# Patient Record
Sex: MALE | Race: WHITE | NOT HISPANIC OR LATINO | URBAN - METROPOLITAN AREA
[De-identification: names, ages, dates, MRNs, and addresses within clinical notes are randomized per-mention and may not be internally consistent; named-entity substitution may affect disease eponyms.]

---

## 2021-12-08 ENCOUNTER — SPOT (OUTPATIENT)
Dept: URBAN - METROPOLITAN AREA CLINIC 38 | Facility: CLINIC | Age: 76
Setting detail: DERMATOLOGY
End: 2021-12-08

## 2021-12-08 DIAGNOSIS — L57.0 ACTINIC KERATOSIS: ICD-10-CM

## 2021-12-08 PROBLEM — L82.1 OTHER SEBORRHEIC KERATOSIS: Status: RESOLVED | Noted: 2021-12-08

## 2021-12-08 PROBLEM — D48.5 NEOPLASM OF UNCERTAIN BEHAVIOR OF SKIN: Status: RESOLVED | Noted: 2021-12-08

## 2021-12-08 PROCEDURE — 99213 OFFICE O/P EST LOW 20 MIN: CPT

## 2021-12-14 ENCOUNTER — OTHER (OUTPATIENT)
Dept: URBAN - METROPOLITAN AREA CLINIC 38 | Facility: CLINIC | Age: 76
Setting detail: DERMATOLOGY
End: 2021-12-14

## 2021-12-14 DIAGNOSIS — D23.5 OTHER BENIGN NEOPLASM OF SKIN OF TRUNK: ICD-10-CM

## 2021-12-14 DIAGNOSIS — L90.5 SCAR CONDITIONS AND FIBROSIS OF SKIN: ICD-10-CM

## 2021-12-14 DIAGNOSIS — L57.8 OTHER SKIN CHANGES DUE TO CHRONIC EXPOSURE TO NONIONIZING RADIATION: ICD-10-CM

## 2021-12-14 DIAGNOSIS — Z85.828 PERSONAL HISTORY OF OTHER MALIGNANT NEOPLASM OF SKIN: ICD-10-CM

## 2021-12-14 DIAGNOSIS — L82.1 OTHER SEBORRHEIC KERATOSIS: ICD-10-CM

## 2021-12-14 PROBLEM — D48.5 NEOPLASM OF UNCERTAIN BEHAVIOR OF SKIN: Status: RESOLVED | Noted: 2021-12-14

## 2021-12-14 PROCEDURE — 17282 DSTR MAL LS F/E/E/N/L/M1.1-2: CPT

## 2022-10-27 ENCOUNTER — HOSPITAL ENCOUNTER (OUTPATIENT)
Dept: NON INVASIVE DIAGNOSTICS | Age: 77
Discharge: HOME | End: 2022-10-27
Payer: MEDICARE

## 2022-10-27 ENCOUNTER — OFFICE VISIT (OUTPATIENT)
Dept: CARDIOLOGY CLINIC | Age: 77
End: 2022-10-27
Payer: MEDICARE

## 2022-10-27 VITALS
WEIGHT: 217 LBS | DIASTOLIC BLOOD PRESSURE: 60 MMHG | SYSTOLIC BLOOD PRESSURE: 120 MMHG | OXYGEN SATURATION: 95 % | HEART RATE: 107 BPM

## 2022-10-27 DIAGNOSIS — I10 BENIGN ESSENTIAL HTN: ICD-10-CM

## 2022-10-27 DIAGNOSIS — I10 BENIGN ESSENTIAL HTN: Primary | ICD-10-CM

## 2022-10-27 DIAGNOSIS — R94.31 ABNORMAL EKG: ICD-10-CM

## 2022-10-27 PROBLEM — M19.019 PRIMARY OSTEOARTHRITIS OF SHOULDER: Status: ACTIVE | Noted: 2018-04-02

## 2022-10-27 PROBLEM — H35.3120 NONEXUDATIVE AGE-RELATED MACULAR DEGENERATION OF LEFT EYE: Status: ACTIVE | Noted: 2020-09-02

## 2022-10-27 PROBLEM — E78.2 MIXED HYPERLIPIDEMIA: Status: ACTIVE | Noted: 2018-04-02

## 2022-10-27 PROBLEM — F41.1 GAD (GENERALIZED ANXIETY DISORDER): Status: ACTIVE | Noted: 2018-04-02

## 2022-10-27 PROBLEM — M43.10 ACQUIRED SPONDYLOLISTHESIS: Status: ACTIVE | Noted: 2018-04-02

## 2022-10-27 PROBLEM — N40.0 HYPERPLASIA OF PROSTATE: Status: ACTIVE | Noted: 2018-04-02

## 2022-10-27 PROBLEM — M51.36 DDD (DEGENERATIVE DISC DISEASE), LUMBAR: Status: ACTIVE | Noted: 2022-07-18

## 2022-10-27 PROBLEM — M48.061 SPINAL STENOSIS OF LUMBAR REGION AT MULTIPLE LEVELS: Status: ACTIVE | Noted: 2022-07-18

## 2022-10-27 PROBLEM — R73.01 IFG (IMPAIRED FASTING GLUCOSE): Status: ACTIVE | Noted: 2020-01-13

## 2022-10-27 PROCEDURE — G8484 FLU IMMUNIZE NO ADMIN: HCPCS | Performed by: INTERNAL MEDICINE

## 2022-10-27 PROCEDURE — G8421 BMI NOT CALCULATED: HCPCS | Performed by: INTERNAL MEDICINE

## 2022-10-27 PROCEDURE — 1123F ACP DISCUSS/DSCN MKR DOCD: CPT | Performed by: INTERNAL MEDICINE

## 2022-10-27 PROCEDURE — 93226 XTRNL ECG REC<48 HR SCAN A/R: CPT

## 2022-10-27 PROCEDURE — 93017 CV STRESS TEST TRACING ONLY: CPT

## 2022-10-27 PROCEDURE — 99204 OFFICE O/P NEW MOD 45 MIN: CPT | Performed by: INTERNAL MEDICINE

## 2022-10-27 PROCEDURE — G8427 DOCREV CUR MEDS BY ELIG CLIN: HCPCS | Performed by: INTERNAL MEDICINE

## 2022-10-27 PROCEDURE — 93225 XTRNL ECG REC<48 HRS REC: CPT

## 2022-10-27 PROCEDURE — 3074F SYST BP LT 130 MM HG: CPT | Performed by: INTERNAL MEDICINE

## 2022-10-27 PROCEDURE — 1036F TOBACCO NON-USER: CPT | Performed by: INTERNAL MEDICINE

## 2022-10-27 PROCEDURE — 3078F DIAST BP <80 MM HG: CPT | Performed by: INTERNAL MEDICINE

## 2022-10-27 RX ORDER — SERTRALINE HYDROCHLORIDE 25 MG/1
25 TABLET, FILM COATED ORAL DAILY
COMMUNITY
Start: 2022-10-17

## 2022-10-27 RX ORDER — AMLODIPINE BESYLATE 10 MG/1
10 TABLET ORAL DAILY
COMMUNITY
Start: 2022-10-17

## 2022-10-27 RX ORDER — HYDROCHLOROTHIAZIDE 25 MG/1
25 TABLET ORAL DAILY
COMMUNITY
Start: 2022-10-17

## 2022-10-27 RX ORDER — DOXAZOSIN 8 MG/1
8 TABLET ORAL DAILY
COMMUNITY
Start: 2022-10-17

## 2022-10-27 RX ORDER — OMEPRAZOLE 10 MG/1
10 CAPSULE, DELAYED RELEASE ORAL DAILY
COMMUNITY

## 2022-10-27 NOTE — PROGRESS NOTES
Ov Dr. Binta Glass for new pt   Cardiac Clearance needed for back surgery   Pre op testing done fri at [facility]   Has abn ekg   No chest pain or heaviness   No palpitations   No sob   No dizziness or lightheadedness  No edema   had stress - [facility]- negative   Bedside echo done         Retired in  from [employer]   Just bought a house in [place] - lives in town   Mom had heart issues   1  sister   3 living - no heart issues   Non smoker   4 children -2 girls 2 boys   One girl - avis - is a teacher  One girl -frida is a     Will set up for treadmill stress test   And 24 hr holter

## 2022-10-29 LAB
ACQUISITION DURATION: NORMAL S
AVERAGE HEART RATE: 64 BPM
EKG DIAGNOSIS: NORMAL
HOLTER MAX HEART RATE: 102 BPM
HOOKUP DATE: NORMAL
HOOKUP TIME: NORMAL
MAX HEART RATE TIME/DATE: NORMAL
MIN HEART RATE TIME/DATE: NORMAL
MIN HEART RATE: 49 BPM
NUMBER OF QRS COMPLEXES: NORMAL
NUMBER OF SUPRAVENTRICULAR COUPLETS: 6
NUMBER OF SUPRAVENTRICULAR ECTOPICS: 1461
NUMBER OF SUPRAVENTRICULAR ISOLATED BEATS: 1268
NUMBER OF VENTRICULAR BIGEMINAL CYCLES: 7
NUMBER OF VENTRICULAR COUPLETS: 295
NUMBER OF VENTRICULAR ECTOPICS: 5015

## 2022-10-30 NOTE — PROCEDURES
[place] [address]                              CARDIAC STRESS TEST    PATIENT NAME: Nuria De Dios                  :        1945  MED REC NO:   [id number]                              ROOM:  ACCOUNT NO:   [de-identified]                           ADMIT DATE: 10/27/2022  PROVIDER:     Caitlin Schroeder. Charly Bill MD    DATE OF STUDY:  10/27/2022    TREADMILL STRESS TEST    He exercised 2 minutes on accelerated Ck protocol. His peak heart is  117 which is 92% of maximum predicted heart rate. He exercised 4 METs. His main limitation was that of severe arthritis. He had no chest pain. He had no ST depression. As previously stated above, is his stress test.  This is overall a  negative or normal cardiac stress test with no evidence of ischemia at  this workload.         Ladarius Staley MD    D: 10/29/2022 21:05:39       T: 10/30/2022 4:53:16     [id number]  Job#: [id number]     Doc#: [id number]    CC:

## 2022-10-30 NOTE — PROGRESS NOTES
Raheel Singh M.D. [address] [place] [address]  [phone]          2022          Jd Dietrich MD  [address] [place] [address], [address] [place] [address]      RE:   Luz Coleman  :  1945      Dear Dr. Delacruz Fitting:    Guillermo Chirinos:  Abnormal EKG with PVCs. HISTORY OF PRESENT ILLNESS:  I had the pleasure of seeing . Luz Coleman and his wife, Mallika Duke, in our office on 10/27/2022. He is a very pleasant 65-year-old gentleman who has never had a cardiac issue in the past.  He did have a stress test in , at [facility] which was negative. He stays active, although recently has had pain in L3-L4 and is pending having a laminectomy at [facility]. In preparation for his back surgery, he had an EKG that showed sinus rhythm with nonspecific ST changes and PVCs. Because of his trigeminy PVCs, I was asked to see him in consult. He denies any history of chest pain or chest discomfort. He normally is quite active. He has had no unusual shortness of breath and no palpitations. His energy level has been good. He has never had any syncope, near syncope, lightheadedness, or dizziness. He denies any pedal edema. He has never had a cardiac catheterization or myocardial infarction. CARDIAC RISK FACTORS:  Hyperlipidemia:  Negative. Hypertension:  Positive. Peripheral Vascular Disease:  Negative. Smoking:  Negative. Other Family Members:  Positive. Diabetes:  Negative. MEDICATIONS:  He is on Norvasc 10 mg daily, vitamin D 2000 units daily, Cardura 8 mg daily, HydroDIURIL 25 mg daily, Prilosec 10 mg daily, Zoloft 25 mg daily. PAST MEDICAL AND SURGICAL HISTORY:  1. He has had a long history of hypertension. 2.  He has degenerative disk disease. 3.  History of anxiety. 4.  Benign prostatic hypertrophy. 5.  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 6.  Spinal stenosis of the lumbar region.   7.  Left knee surgery. 8.  Skin cancer, basal cell carcinoma, removed. 9.  Vitrectomy in 2014 and 2015, on the left. FAMILY HISTORY:  Mother had coronary artery disease. One sister  of cancer. Four living, no heart disease. Mother had heart disease. SOCIAL HISTORY:  He is 65years old, , retired in , from the [employer] company. He and his wife downsized and bought a house in [place]. He does not smoke, does not drink alcohol. He has four children with two boys and two girls, one girl is a teacher, another girl lives in [place], is a . His wife, Dolly Jolly, works on outpatient surgery floor at [employer] in [place]. REVIEW OF SYSTEMS:  Cardiac as above. Other systems reviewed including constitutional, eyes, ears, nose and throat, cardiovascular, respiratory, GI, , musculoskeletal, integumentary, neurologic, psychiatric, endocrine, hematologic and allergic/immunologic are negative except for what is described above. No weight loss or weight gain. No change in bowel habits. No blood in stools. No fevers, sweats or chills. PHYSICAL EXAMINATION:  VITAL SIGNS:  His blood pressure was 120/60 with a heart rate of 107 and regular. Respirations were 18. O2 saturation 95%. Weight 217 pounds. GENERAL:  He is a very pleasant 24-year-old gentleman. Denied pain. He was oriented to person, place and time. Answered questions appropriately. SKIN:  No unusual skin changes. HEENT:  The pupils are equally round and intact. Mucous membranes were dry. NECK:  No JVD. Good carotid pulses. No carotid bruits. No lymphadenopathy or thyromegaly. CARDIOVASCULAR EXAM:  S1 and S2 were normal.  No S3 or S4. Soft systolic blowing type murmur. No diastolic murmur. PMI was normal.  No lift, thrust, or pericardial friction rub. LUNGS:  Clear to auscultation and percussion. ABDOMEN:  Soft and nontender. Good bowel sounds. EXTREMITIES:  Good femoral pulses. Good pedal pulses.   No pedal edema. Skin was warm and dry. No calf tenderness. Nail beds pink. Good cap refill. PULSES:  Bilateral symmetrical radial, brachial and carotid pulses. No carotid bruits. Good femoral and pedal pulses. NEUROLOGIC EXAM:  Within normal limits. PSYCHIATRIC EXAM:  Within normal limits. LABORATORY DATA:  His white count was 8.79, hemoglobin 15.5 with a platelet count [phone]. Sodium was 139, potassium 3.7, glucose 121, BUN 20, creatinine 1. 12.  Calcium was 9.2. AST was 18, ALT was 20. Hemoglobin A1c was 5.9. Cholesterol 160, triglycerides 140, HDL was 44, LDL 88. TSH was 0.72. EKG showed sinus rhythm, nonspecific ST changes with PVCs. I did a very low level treadmill stress test.  He exercised 2 minutes at 1.2 miles per hour with a 10% grade. He had no chest pain, no ST depression, although his heart was inadequate for diagnostic strut treadmill. His PVCs were at rest; they decreased during activity. Bedside echocardiogram showed normal LV function with ejection fraction of 55%. IMPRESSION:  1. PVCs with trigeminy. 2.  Decrease in his PVCs on a very low level treadmill. 3.  Normal LV function. 4.  No history of syncope or near syncope. 5.  Hypertension, well controlled. 6.  Cleared for L3-L4 disk disease. PLAN:  We will do a 24-hour Holter monitor to quantify his PVCs. DISCUSSION:  Mr. Juanito Jaimes is totally asymptomatic. He has had no chest pain, chest discomfort, shortness of breath, or loss of energy to indicate we need to do a full stress test.    He does have trigeminy on his PVCs. However, at this time, they appear to be benign. He has normal LV function with no symptoms of syncope or near syncope. During his very low level treadmill, they decreased in frequency. I will do a 24-hour Holter monitor to quantify his PVCs over 24 hours. From my standpoint, he is cleared for his laminectomy. We would not treat his PVCs at this time.   If by chance they happen to be more than 14,000 per 24 hours, then I would consider a low dose of Sectral which does a good job in decreasing PVCs. Again, he is cleared for laminectomy from my standpoint. Thank you very much for allowing me the privilege of seeing Anuel Martin. If you have any questions on my thoughts, please do not hesitate to contact me. Sincerely,        Nery Viera    D: 10/27/2022 12:34:49     T: 10/27/2022 12:39:52     [email or web address]  Job#: [id number]   Doc#: [id number]    ADDENDUM:    His holter monitor looked good. No tachycardia or bradycardia. Cleared for surgery.

## 2022-11-01 ENCOUNTER — TELEPHONE (OUTPATIENT)
Dept: CARDIOLOGY CLINIC | Age: 77
End: 2022-11-01

## 2023-09-19 PROBLEM — R39.12 WEAK URINARY STREAM: Status: ACTIVE | Noted: 2023-09-19

## 2023-09-19 PROBLEM — N40.1 BENIGN PROSTATIC HYPERPLASIA WITH URINARY OBSTRUCTION AND OTHER LOWER URINARY TRACT SYMPTOMS: Status: ACTIVE | Noted: 2023-09-19

## 2023-09-19 PROBLEM — N52.9 ERECTILE DYSFUNCTION: Status: ACTIVE | Noted: 2023-09-19

## 2023-09-19 PROBLEM — R35.1 NOCTURIA: Status: ACTIVE | Noted: 2023-09-19

## 2023-09-19 PROBLEM — N28.1 RENAL CYST: Status: ACTIVE | Noted: 2023-09-19

## 2023-09-19 PROBLEM — N13.8 BENIGN PROSTATIC HYPERPLASIA WITH URINARY OBSTRUCTION AND OTHER LOWER URINARY TRACT SYMPTOMS: Status: ACTIVE | Noted: 2023-09-19

## 2023-09-19 PROBLEM — R97.20 ELEVATED PSA: Status: ACTIVE | Noted: 2023-09-19

## 2023-09-19 RX ORDER — HYDROCHLOROTHIAZIDE 25 MG/1
TABLET ORAL
COMMUNITY

## 2023-09-19 RX ORDER — AMLODIPINE BESYLATE 5 MG/1
2.5 TABLET ORAL DAILY
COMMUNITY

## 2023-09-19 RX ORDER — FLUTICASONE PROPIONATE 50 MCG
SPRAY, SUSPENSION (ML) NASAL
COMMUNITY
End: 2023-10-19 | Stop reason: ALTCHOICE

## 2023-10-12 ENCOUNTER — LAB (OUTPATIENT)
Dept: LAB | Facility: LAB | Age: 78
End: 2023-10-12
Payer: MEDICARE

## 2023-10-12 DIAGNOSIS — R35.1 NOCTURIA: ICD-10-CM

## 2023-10-12 DIAGNOSIS — N40.1 BENIGN PROSTATIC HYPERPLASIA WITH LOWER URINARY TRACT SYMPTOMS: Primary | ICD-10-CM

## 2023-10-12 DIAGNOSIS — N40.1 BENIGN PROSTATIC HYPERPLASIA WITH LOWER URINARY TRACT SYMPTOMS: ICD-10-CM

## 2023-10-12 DIAGNOSIS — R97.20 ELEVATED PROSTATE SPECIFIC ANTIGEN (PSA): ICD-10-CM

## 2023-10-12 LAB — PSA SERPL-MCNC: 8.09 NG/ML

## 2023-10-12 PROCEDURE — 84153 ASSAY OF PSA TOTAL: CPT

## 2023-10-12 PROCEDURE — 36415 COLL VENOUS BLD VENIPUNCTURE: CPT

## 2023-10-16 ENCOUNTER — APPOINTMENT (OUTPATIENT)
Dept: URBAN - METROPOLITAN AREA CLINIC 204 | Age: 78
Setting detail: DERMATOLOGY
End: 2023-10-17

## 2023-10-16 PROCEDURE — OTHER MIPS QUALITY: OTHER

## 2023-10-16 PROCEDURE — 17000 DESTRUCT PREMALG LESION: CPT

## 2023-10-16 PROCEDURE — 99213 OFFICE O/P EST LOW 20 MIN: CPT | Mod: 25

## 2023-10-16 PROCEDURE — 17003 DESTRUCT PREMALG LES 2-14: CPT

## 2023-10-18 ASSESSMENT — ENCOUNTER SYMPTOMS
NAUSEA: 0
SHORTNESS OF BREATH: 0
COUGH: 0
CHILLS: 0
EYES NEGATIVE: 1
PSYCHIATRIC NEGATIVE: 1
FEVER: 0
ALLERGIC/IMMUNOLOGIC NEGATIVE: 1
ENDOCRINE NEGATIVE: 1
DIFFICULTY URINATING: 0

## 2023-10-18 NOTE — PROGRESS NOTES
Subjective   Patient ID: Evan Sewell is a 78 y.o. male.    HPI  Patient has Hx of Elevated PSA. Most recent PSA was 8.09 on 10/23. Prior PSA was  5.77 (9/22) PRevious PSA was 6.53 on 12/2021. Prior PSA was 6.75 11/20...patient did have a Urine select test 11/18 which showed 38% chance of detecting low grade and a 13% chance of detecting a [place] of 7 or higher..he has had a couple negative bx done in the past..12/13 was most recent bx done...Chronic BPH Sx are mild and stable...No urgency or frequency...weak stream at times...No dysuria..No hematuria...Nocturia 2-3. ...caffeine does worsen sx...He is taking Doxazosin for HTN. . Patient has Hx of Microhematuria and Renal Cyst. Last U/S and cysto was on 6/23. . ED is mild and not an issue .       Review of Systems   Constitutional:  Negative for chills and fever.   HENT: Negative.     Eyes: Negative.    Respiratory:  Negative for cough and shortness of breath.    Cardiovascular:  Negative for chest pain and leg swelling.   Gastrointestinal:  Negative for nausea.   Endocrine: Negative.    Genitourinary:  Negative for difficulty urinating.        Negative except for documented in HPI   Allergic/Immunologic: Negative.    Neurological:         Alert & oriented X 3   Hematological:         Denies blood thinners   Psychiatric/Behavioral: Negative.         Objective   Physical Exam  Vitals and nursing note reviewed.   Constitutional:       General: He is not in acute distress.     Appearance: Normal appearance.   Pulmonary:      Effort: Pulmonary effort is normal.   Abdominal:      Tenderness: There is no abdominal tenderness.   Genitourinary:     Comments: Kidneys non palpable bilaterally  Bladder non palpable or tender  Scrotum no mass, No hydrocele  Epididymis- No spermatocele. Non Tender.  Testicles: No mass  Urethra: No discharge  Penis within normal limits... No lesions  Prostate - Slightly Asymmetric, no nodules. Slightly firmer on L  Seminal Vesicals: No  mass.  Sphincter tone: normal  Neurological:      Mental Status: He is alert.       Assessment/Plan       Diagnoses and all orders for this visit:  Benign prostatic hyperplasia with urinary obstruction and other lower urinary tract symptoms  Elevated PSA  Erectile dysfunction, unspecified erectile dysfunction type  Nocturia      All available PSA values reviewed, Options discussed. Questions answered.  Pros and cons of prostate biopsy reviewed. Other options discussed. Questions answered  Past Bx reviewed  MRI ordered   Diet changes for prostate health discussed and educational information given. Pros/Cons of prostate health supplements discussed.   Treatment options for LUTS reviewed  Cardura neeta  Discussed timed voiding. Discussed fluid and caffeine intake  Treatment options for ED reviewed.  Lifestyle change to help prevent UTIs discussed. Encouraged fluid intake.    F/U with MRI

## 2023-10-19 ENCOUNTER — OFFICE VISIT (OUTPATIENT)
Dept: UROLOGY | Facility: CLINIC | Age: 78
End: 2023-10-19
Payer: MEDICARE

## 2023-10-19 VITALS — RESPIRATION RATE: 16 BRPM | HEIGHT: 74 IN | BODY MASS INDEX: 28.75 KG/M2 | WEIGHT: 224 LBS

## 2023-10-19 DIAGNOSIS — N52.9 ERECTILE DYSFUNCTION, UNSPECIFIED ERECTILE DYSFUNCTION TYPE: ICD-10-CM

## 2023-10-19 DIAGNOSIS — N13.8 BENIGN PROSTATIC HYPERPLASIA WITH URINARY OBSTRUCTION AND OTHER LOWER URINARY TRACT SYMPTOMS: ICD-10-CM

## 2023-10-19 DIAGNOSIS — R35.1 NOCTURIA: ICD-10-CM

## 2023-10-19 DIAGNOSIS — R97.20 ELEVATED PSA: ICD-10-CM

## 2023-10-19 DIAGNOSIS — N40.1 BENIGN PROSTATIC HYPERPLASIA WITH URINARY OBSTRUCTION AND OTHER LOWER URINARY TRACT SYMPTOMS: ICD-10-CM

## 2023-10-19 PROCEDURE — 1036F TOBACCO NON-USER: CPT | Performed by: UROLOGY

## 2023-10-19 PROCEDURE — 99214 OFFICE O/P EST MOD 30 MIN: CPT | Performed by: UROLOGY

## 2023-10-19 RX ORDER — OMEPRAZOLE 10 MG/1
10 CAPSULE, DELAYED RELEASE ORAL
COMMUNITY

## 2023-10-19 RX ORDER — SERTRALINE HYDROCHLORIDE 25 MG/1
25 TABLET, FILM COATED ORAL
COMMUNITY
Start: 2022-10-17

## 2023-10-19 RX ORDER — DOXAZOSIN 8 MG/1
8 TABLET ORAL DAILY
COMMUNITY

## 2023-10-19 RX ORDER — GABAPENTIN 300 MG/1
300 CAPSULE ORAL 3 TIMES DAILY
COMMUNITY
Start: 2023-10-06 | End: 2024-04-18 | Stop reason: WASHOUT

## 2023-10-26 ENCOUNTER — HOSPITAL ENCOUNTER (OUTPATIENT)
Dept: RADIOLOGY | Facility: HOSPITAL | Age: 78
Discharge: HOME | End: 2023-10-26
Payer: MEDICARE

## 2023-10-26 DIAGNOSIS — R97.20 ELEVATED PSA: ICD-10-CM

## 2023-10-26 PROCEDURE — 72197 MRI PELVIS W/O & W/DYE: CPT | Mod: MG

## 2023-10-26 PROCEDURE — 2550000001 HC RX 255 CONTRASTS: Performed by: UROLOGY

## 2023-10-26 PROCEDURE — 72197 MRI PELVIS W/O & W/DYE: CPT | Performed by: RADIOLOGY

## 2023-10-26 PROCEDURE — A9575 INJ GADOTERATE MEGLUMI 0.1ML: HCPCS | Performed by: UROLOGY

## 2023-10-26 RX ORDER — GADOTERATE MEGLUMINE 376.9 MG/ML
20 INJECTION INTRAVENOUS
Status: COMPLETED | OUTPATIENT
Start: 2023-10-26 | End: 2023-10-26

## 2023-10-26 RX ADMIN — GADOTERATE MEGLUMINE 20 ML: 376.9 INJECTION INTRAVENOUS at 14:29

## 2023-10-31 ENCOUNTER — APPOINTMENT (OUTPATIENT)
Dept: URBAN - METROPOLITAN AREA CLINIC 204 | Age: 78
Setting detail: DERMATOLOGY
End: 2023-11-01

## 2023-10-31 PROCEDURE — OTHER MIPS QUALITY: OTHER

## 2023-10-31 PROCEDURE — 17272 DSTR MAL LES S/N/H/F/G 1.1-2: CPT

## 2023-11-01 ASSESSMENT — ENCOUNTER SYMPTOMS
COUGH: 0
SHORTNESS OF BREATH: 0
EYES NEGATIVE: 1
CHILLS: 0
FEVER: 0
DIFFICULTY URINATING: 0
PSYCHIATRIC NEGATIVE: 1
NAUSEA: 0
ALLERGIC/IMMUNOLOGIC NEGATIVE: 1
ENDOCRINE NEGATIVE: 1

## 2023-11-01 NOTE — PROGRESS NOTES
Subjective   Patient ID: Evan Sewell is a 78 y.o. male.    HPI  Patient is here for prostate MRI results. MRI showed 3.1cm PI-RAD 5 lesion, multiple enhancing osseous lesion seen within the left ronan sacrum.  Most recent PSA was 8.09 on 10/23. Prior PSA was  5.77 (9/22) PRevious PSA was 6.53 on 12/2021. Prior PSA was 6.75 11/20...patient did have a Urine select test 11/18 which showed 38% chance of detecting low grade and a 13% chance of detecting a [place] of 7 or higher..he has had a couple negative bx done in the past..12/13 was most recent bx done...Chronic BPH Sx are mild and stable...No urgency or frequency...weak stream at times...No dysuria..No hematuria...Nocturia 2-3. ...caffeine does worsen sx...He is taking Doxazosin for HTN. . Patient has Hx of Microhematuria and Renal Cyst. Last U/S and cysto was on 6/23. . ED is mild and not an issue .       Review of Systems   Constitutional:  Negative for chills and fever.   HENT: Negative.     Eyes: Negative.    Respiratory:  Negative for cough and shortness of breath.    Cardiovascular:  Negative for chest pain and leg swelling.   Gastrointestinal:  Negative for nausea.   Endocrine: Negative.    Genitourinary:  Negative for difficulty urinating.        Negative except for documented in HPI   Allergic/Immunologic: Negative.    Neurological:         Alert & oriented X 3   Hematological:         Denies blood thinners   Psychiatric/Behavioral: Negative.         Objective   Physical Exam  Vitals and nursing note reviewed.   Constitutional:       General: He is not in acute distress.     Appearance: Normal appearance.   Pulmonary:      Effort: Pulmonary effort is normal.   Abdominal:      Tenderness: There is no abdominal tenderness.   Genitourinary:     Comments: Kidneys non palpable bilaterally  Bladder non palpable or tender  Scrotum no mass, No hydrocele  Epididymis- No spermatocele. Non Tender.  Testicles: No mass  Urethra: No discharge  Penis within normal  limits... No lesions  Prostate - deferred  Neurological:      Mental Status: He is alert.         Assessment/Plan   Diagnoses and all orders for this visit:  Benign prostatic hyperplasia with urinary obstruction and other lower urinary tract symptoms  Elevated PSA  Nocturia  Erectile dysfunction, unspecified erectile dysfunction type      All available PSA values reviewed, Options discussed. Questions answered.  Pros and cons of prostate biopsy reviewed. Other options discussed. Questions answered  MRI reviewed and Bx scheduled  Bone scan svheduled  Cipro Rx given   Diet changes for prostate health discussed and educational information given. Pros/Cons of prostate health supplements discussed.   Treatment options for LUTS reviewed  Discussed timed voiding. Discussed fluid and caffeine intake  Treatment options for ED reviewed.  Lifestyle change to help prevent UTIs discussed. Encouraged fluid intake.    F/U with Bone Scan and Prostate Bx     Yes

## 2023-11-01 NOTE — H&P (VIEW-ONLY)
Subjective   Patient ID: Evan Sewell is a 78 y.o. male.    HPI  Patient is here for prostate MRI results. MRI showed 3.1cm PI-RAD 5 lesion, multiple enhancing osseous lesion seen within the left ronan sacrum.  Most recent PSA was 8.09 on 10/23. Prior PSA was  5.77 (9/22) PRevious PSA was 6.53 on 12/2021. Prior PSA was 6.75 11/20...patient did have a Urine select test 11/18 which showed 38% chance of detecting low grade and a 13% chance of detecting a [place] of 7 or higher..he has had a couple negative bx done in the past..12/13 was most recent bx done...Chronic BPH Sx are mild and stable...No urgency or frequency...weak stream at times...No dysuria..No hematuria...Nocturia 2-3. ...caffeine does worsen sx...He is taking Doxazosin for HTN. . Patient has Hx of Microhematuria and Renal Cyst. Last U/S and cysto was on 6/23. . ED is mild and not an issue .       Review of Systems   Constitutional:  Negative for chills and fever.   HENT: Negative.     Eyes: Negative.    Respiratory:  Negative for cough and shortness of breath.    Cardiovascular:  Negative for chest pain and leg swelling.   Gastrointestinal:  Negative for nausea.   Endocrine: Negative.    Genitourinary:  Negative for difficulty urinating.        Negative except for documented in HPI   Allergic/Immunologic: Negative.    Neurological:         Alert & oriented X 3   Hematological:         Denies blood thinners   Psychiatric/Behavioral: Negative.         Objective   Physical Exam  Vitals and nursing note reviewed.   Constitutional:       General: He is not in acute distress.     Appearance: Normal appearance.   Pulmonary:      Effort: Pulmonary effort is normal.   Abdominal:      Tenderness: There is no abdominal tenderness.   Genitourinary:     Comments: Kidneys non palpable bilaterally  Bladder non palpable or tender  Scrotum no mass, No hydrocele  Epididymis- No spermatocele. Non Tender.  Testicles: No mass  Urethra: No discharge  Penis within normal  limits... No lesions  Prostate - deferred  Neurological:      Mental Status: He is alert.         Assessment/Plan   Diagnoses and all orders for this visit:  Benign prostatic hyperplasia with urinary obstruction and other lower urinary tract symptoms  Elevated PSA  Nocturia  Erectile dysfunction, unspecified erectile dysfunction type      All available PSA values reviewed, Options discussed. Questions answered.  Pros and cons of prostate biopsy reviewed. Other options discussed. Questions answered  MRI reviewed and Bx scheduled  Bone scan svheduled  Cipro Rx given   Diet changes for prostate health discussed and educational information given. Pros/Cons of prostate health supplements discussed.   Treatment options for LUTS reviewed  Discussed timed voiding. Discussed fluid and caffeine intake  Treatment options for ED reviewed.  Lifestyle change to help prevent UTIs discussed. Encouraged fluid intake.    F/U with Bone Scan and Prostate Bx

## 2023-11-02 ENCOUNTER — OFFICE VISIT (OUTPATIENT)
Dept: UROLOGY | Facility: CLINIC | Age: 78
End: 2023-11-02
Payer: MEDICARE

## 2023-11-02 VITALS — BODY MASS INDEX: 29.13 KG/M2 | HEIGHT: 74 IN | WEIGHT: 227 LBS | RESPIRATION RATE: 16 BRPM

## 2023-11-02 DIAGNOSIS — N52.9 ERECTILE DYSFUNCTION, UNSPECIFIED ERECTILE DYSFUNCTION TYPE: ICD-10-CM

## 2023-11-02 DIAGNOSIS — R35.1 NOCTURIA: ICD-10-CM

## 2023-11-02 DIAGNOSIS — N40.1 BENIGN PROSTATIC HYPERPLASIA WITH URINARY OBSTRUCTION AND OTHER LOWER URINARY TRACT SYMPTOMS: ICD-10-CM

## 2023-11-02 DIAGNOSIS — N13.8 BENIGN PROSTATIC HYPERPLASIA WITH URINARY OBSTRUCTION AND OTHER LOWER URINARY TRACT SYMPTOMS: ICD-10-CM

## 2023-11-02 DIAGNOSIS — R97.20 ELEVATED PSA: ICD-10-CM

## 2023-11-02 PROCEDURE — 99213 OFFICE O/P EST LOW 20 MIN: CPT | Performed by: UROLOGY

## 2023-11-02 PROCEDURE — 1159F MED LIST DOCD IN RCRD: CPT | Performed by: UROLOGY

## 2023-11-02 PROCEDURE — 1036F TOBACCO NON-USER: CPT | Performed by: UROLOGY

## 2023-11-02 RX ORDER — CIPROFLOXACIN 500 MG/1
500 TABLET ORAL 2 TIMES DAILY
Qty: 6 TABLET | Refills: 0 | Status: SHIPPED | OUTPATIENT
Start: 2023-11-02 | End: 2023-11-05

## 2023-11-03 ENCOUNTER — PREP FOR PROCEDURE (OUTPATIENT)
Dept: UROLOGY | Facility: CLINIC | Age: 78
End: 2023-11-03
Payer: MEDICARE

## 2023-11-03 DIAGNOSIS — R97.20 ELEVATED PSA: ICD-10-CM

## 2023-11-06 ENCOUNTER — APPOINTMENT (OUTPATIENT)
Dept: UROLOGY | Facility: CLINIC | Age: 78
End: 2023-11-06
Payer: MEDICARE

## 2023-11-08 ENCOUNTER — HOSPITAL ENCOUNTER (OUTPATIENT)
Dept: RADIOLOGY | Facility: HOSPITAL | Age: 78
Discharge: HOME | End: 2023-11-08
Payer: MEDICARE

## 2023-11-08 DIAGNOSIS — R97.20 ELEVATED PSA: ICD-10-CM

## 2023-11-08 PROCEDURE — 78306 BONE IMAGING WHOLE BODY: CPT | Performed by: RADIOLOGY

## 2023-11-08 PROCEDURE — 3430000001 HC RX 343 DIAGNOSTIC RADIOPHARMACEUTICALS: Performed by: UROLOGY

## 2023-11-08 PROCEDURE — A9503 TC99M MEDRONATE: HCPCS | Performed by: UROLOGY

## 2023-11-08 PROCEDURE — 78306 BONE IMAGING WHOLE BODY: CPT

## 2023-11-08 RX ADMIN — TECHNETIUM TC 99M MEDRONATE 27.1 MILLICURIE: 25 INJECTION, POWDER, FOR SOLUTION INTRAVENOUS at 09:20

## 2023-11-10 RX ORDER — CALCIUM CARB/VITAMIN D3/VIT K1 500-500-40
1 TABLET,CHEWABLE ORAL DAILY
COMMUNITY

## 2023-11-14 ENCOUNTER — ANESTHESIA (OUTPATIENT)
Dept: OPERATING ROOM | Facility: HOSPITAL | Age: 78
End: 2023-11-14
Payer: MEDICARE

## 2023-11-14 ENCOUNTER — ANESTHESIA EVENT (OUTPATIENT)
Dept: OPERATING ROOM | Facility: HOSPITAL | Age: 78
End: 2023-11-14
Payer: MEDICARE

## 2023-11-14 ENCOUNTER — HOSPITAL ENCOUNTER (OUTPATIENT)
Facility: HOSPITAL | Age: 78
Setting detail: OUTPATIENT SURGERY
Discharge: HOME | End: 2023-11-14
Attending: UROLOGY | Admitting: UROLOGY
Payer: MEDICARE

## 2023-11-14 VITALS
TEMPERATURE: 97 F | RESPIRATION RATE: 16 BRPM | WEIGHT: 223.77 LBS | SYSTOLIC BLOOD PRESSURE: 134 MMHG | HEIGHT: 74 IN | BODY MASS INDEX: 28.72 KG/M2 | HEART RATE: 56 BPM | OXYGEN SATURATION: 95 % | DIASTOLIC BLOOD PRESSURE: 84 MMHG

## 2023-11-14 DIAGNOSIS — R97.20 ELEVATED PSA: Primary | ICD-10-CM

## 2023-11-14 PROCEDURE — G0416 PROSTATE BIOPSY, ANY MTHD: HCPCS | Performed by: STUDENT IN AN ORGANIZED HEALTH CARE EDUCATION/TRAINING PROGRAM

## 2023-11-14 PROCEDURE — 55700 PR PROSTATE NEEDLE BIOPSY ANY APPROACH: CPT | Performed by: UROLOGY

## 2023-11-14 PROCEDURE — G0416 PROSTATE BIOPSY, ANY MTHD: HCPCS | Mod: TC,SUR,SAMLAB | Performed by: UROLOGY

## 2023-11-14 PROCEDURE — 2500000005 HC RX 250 GENERAL PHARMACY W/O HCPCS: Performed by: ANESTHESIOLOGY

## 2023-11-14 PROCEDURE — 3600000009 HC OR TIME - EACH INCREMENTAL 1 MINUTE - PROCEDURE LEVEL FOUR: Performed by: UROLOGY

## 2023-11-14 PROCEDURE — 7100000010 HC PHASE TWO TIME - EACH INCREMENTAL 1 MINUTE: Performed by: UROLOGY

## 2023-11-14 PROCEDURE — 2500000004 HC RX 250 GENERAL PHARMACY W/ HCPCS (ALT 636 FOR OP/ED): Performed by: ANESTHESIOLOGY

## 2023-11-14 PROCEDURE — 76942 ECHO GUIDE FOR BIOPSY: CPT | Performed by: UROLOGY

## 2023-11-14 PROCEDURE — 2720000007 HC OR 272 NO HCPCS: Performed by: UROLOGY

## 2023-11-14 PROCEDURE — 7100000009 HC PHASE TWO TIME - INITIAL BASE CHARGE: Performed by: UROLOGY

## 2023-11-14 PROCEDURE — 3600000004 HC OR TIME - INITIAL BASE CHARGE - PROCEDURE LEVEL FOUR: Performed by: UROLOGY

## 2023-11-14 PROCEDURE — 3700000001 HC GENERAL ANESTHESIA TIME - INITIAL BASE CHARGE: Performed by: UROLOGY

## 2023-11-14 PROCEDURE — 3700000002 HC GENERAL ANESTHESIA TIME - EACH INCREMENTAL 1 MINUTE: Performed by: UROLOGY

## 2023-11-14 RX ORDER — SODIUM CHLORIDE, SODIUM LACTATE, POTASSIUM CHLORIDE, CALCIUM CHLORIDE 600; 310; 30; 20 MG/100ML; MG/100ML; MG/100ML; MG/100ML
100 INJECTION, SOLUTION INTRAVENOUS CONTINUOUS
Status: DISCONTINUED | OUTPATIENT
Start: 2023-11-14 | End: 2023-11-14 | Stop reason: HOSPADM

## 2023-11-14 RX ORDER — ONDANSETRON HYDROCHLORIDE 2 MG/ML
INJECTION, SOLUTION INTRAVENOUS AS NEEDED
Status: DISCONTINUED | OUTPATIENT
Start: 2023-11-14 | End: 2023-11-14

## 2023-11-14 RX ORDER — MIDAZOLAM HYDROCHLORIDE 1 MG/ML
2 INJECTION INTRAMUSCULAR; INTRAVENOUS ONCE AS NEEDED
Status: DISCONTINUED | OUTPATIENT
Start: 2023-11-14 | End: 2023-11-14 | Stop reason: HOSPADM

## 2023-11-14 RX ORDER — PROPOFOL 10 MG/ML
INJECTION, EMULSION INTRAVENOUS AS NEEDED
Status: DISCONTINUED | OUTPATIENT
Start: 2023-11-14 | End: 2023-11-14

## 2023-11-14 RX ORDER — MIDAZOLAM HYDROCHLORIDE 1 MG/ML
INJECTION, SOLUTION INTRAMUSCULAR; INTRAVENOUS AS NEEDED
Status: DISCONTINUED | OUTPATIENT
Start: 2023-11-14 | End: 2023-11-14

## 2023-11-14 RX ORDER — HYDROCODONE BITARTRATE AND ACETAMINOPHEN 5; 325 MG/1; MG/1
1 TABLET ORAL EVERY 6 HOURS PRN
Qty: 10 TABLET | Refills: 0 | Status: SHIPPED | OUTPATIENT
Start: 2023-11-14 | End: 2024-04-18 | Stop reason: WASHOUT

## 2023-11-14 RX ORDER — LIDOCAINE HYDROCHLORIDE 10 MG/ML
INJECTION, SOLUTION EPIDURAL; INFILTRATION; INTRACAUDAL; PERINEURAL AS NEEDED
Status: DISCONTINUED | OUTPATIENT
Start: 2023-11-14 | End: 2023-11-14

## 2023-11-14 RX ADMIN — LIDOCAINE HYDROCHLORIDE 25 MG: 10 INJECTION, SOLUTION EPIDURAL; INFILTRATION; INTRACAUDAL; PERINEURAL at 08:03

## 2023-11-14 RX ADMIN — MIDAZOLAM 1 MG: 1 INJECTION INTRAMUSCULAR; INTRAVENOUS at 08:03

## 2023-11-14 RX ADMIN — ONDANSETRON 4 MG: 2 INJECTION INTRAMUSCULAR; INTRAVENOUS at 08:04

## 2023-11-14 RX ADMIN — SODIUM CHLORIDE, POTASSIUM CHLORIDE, SODIUM LACTATE AND CALCIUM CHLORIDE 100 ML/HR: 600; 310; 30; 20 INJECTION, SOLUTION INTRAVENOUS at 07:56

## 2023-11-14 RX ADMIN — MIDAZOLAM 1 MG: 1 INJECTION INTRAMUSCULAR; INTRAVENOUS at 08:01

## 2023-11-14 RX ADMIN — PROPOFOL 50 MG: 10 INJECTION, EMULSION INTRAVENOUS at 08:03

## 2023-11-14 SDOH — HEALTH STABILITY: MENTAL HEALTH: CURRENT SMOKER: 0

## 2023-11-14 ASSESSMENT — PAIN SCALES - GENERAL
PAINLEVEL_OUTOF10: 0 - NO PAIN
PAIN_LEVEL: 3
PAINLEVEL_OUTOF10: 0 - NO PAIN

## 2023-11-14 ASSESSMENT — PAIN - FUNCTIONAL ASSESSMENT
PAIN_FUNCTIONAL_ASSESSMENT: 0-10

## 2023-11-14 NOTE — ANESTHESIA POSTPROCEDURE EVALUATION
Patient: Evan Sewell    Procedure Summary       Date: 11/14/23 Room / Location: [facility] OR 01 / Virtual [facility] OR    Anesthesia Start: 0758 Anesthesia Stop: 0814    Procedure: PROSTATE NEEDLE BX Diagnosis:       Elevated PSA      (Elevated PSA [R97.20])    Surgeons: Gustabo Smith MD Responsible Provider: Gerald Horvath MD    Anesthesia Type: MAC ASA Status: 2            Anesthesia Type: MAC    Vitals Value Taken Time   /93 11/14/23 0817   Temp 36.1 °C (97 °F) 11/14/23 0817   Pulse 65 11/14/23 0817   Resp 18 11/14/23 0817   SpO2 97 % 11/14/23 0817       Anesthesia Post Evaluation    Patient location during evaluation: PACU  Patient participation: complete - patient participated  Level of consciousness: awake and alert  Pain score: 3  Pain management: adequate  Multimodal analgesia pain management approach  Airway patency: patent  Cardiovascular status: acceptable  Respiratory status: acceptable  Hydration status: acceptable          No notable events documented.

## 2023-11-14 NOTE — ANESTHESIA PREPROCEDURE EVALUATION
Patient: Evan Sewell    Procedure Information       Date/Time: 11/14/23 0745    Procedures:       PROSTATE NEEDLE BX      X      X    Location: [facility] OR 01 / [facility] OR    Surgeons: Gustabo Smith MD            Relevant Problems   No relevant active problems       Clinical information reviewed:   Tobacco  Allergies  Meds   Med Hx  Surg Hx   Fam Hx  Soc Hx        NPO Detail:  NPO/Void Status  Carbonhydrate Drink Given Prior to Surgery? : N  Date of Last Liquid: 11/14/23  Time of Last Liquid: 0500  Date of Last Solid: 11/13/23  Time of Last Solid: 1800  Last Intake Type: Clear fluids  Time of Last Void: 0629         Physical Exam    Airway  Mallampati: II  TM distance: >3 FB  Neck ROM: full     Cardiovascular   Rhythm: regular  Rate: normal     Dental    Pulmonary   Breath sounds clear to auscultation     Abdominal            Anesthesia Plan    ASA 2     MAC     The patient is not a current smoker.  Patient was not previously instructed to abstain from smoking on day of procedure.  Patient did not smoke on day of procedure.    intravenous induction   Postoperative administration of opioids is intended.  Anesthetic plan and risks discussed with patient.

## 2023-11-14 NOTE — OP NOTE
PROSTATE NEEDLE BX, X, X Operative Note     Date: 2023  OR Location: [facility] OR    Name: Evan Sewell, : 1945, Age: 78 y.o., MRN: [id number], Sex: male    Diagnosis  Pre-op Diagnosis     * Elevated PSA [R97.20] Post-op Diagnosis     * Elevated PSA [R97.20]     Procedures  PROSTATE NEEDLE BX  71226 - DC PROSTATE NEEDLE BIOPSY ANY APPROACH    X  14909 - CHG US TRANSRECTAL    X  43970 - CHG US GUIDANCE NEEDLE PLACEMENT IMG S&I      Surgeons      * Gustabo Smith - Primary    Resident/Fellow/Other Assistant:  Surgeon(s) and Role:    Procedure Summary  Anesthesia: Monitor Anesthesia Care  ASA: II  Anesthesia Staff: Anesthesiologist: Gerald Horvath MD  Estimated Blood Loss: 0mL  Intra-op Medications:   Medication Name Total Dose   lactated Ringer's infusion 6.67 mL              Anesthesia Record               Intraprocedure I/O Totals          Intake    Propofol Drip 0.00 mL    The total shown is the total volume documented since Anesthesia Start was filed.    Total Intake 0 mL          Specimen:   ID Type Source Tests Collected by Time   1 : PROSTATE NEEDLE BIOPSY RIGHT Tissue PROSTATE NEEDLE BIOPSY RIGHT SURGICAL PATHOLOGY EXAM Gustabo Smith MD 2023   2 : PROSTATE NEEDLE BIOPSY LEFT Tissue PROSTATE NEEDLE BIOPSY LEFT SURGICAL PATHOLOGY EXAM Gustabo Smith MD [id number]   3 : AREA OF INTEREST#1 Tissue PROSTATE BIOPSY TARGETED LILY SURGICAL PATHOLOGY EXAM Gustabo Smith MD 2023        Staff:   Circulator: Tamar Bond RN  Scrub Person: Ynes Yeager RN             Indications: Evan Sewell is an 78 y.o. male who is having surgery for Elevated PSA [R97.20].     The patient was seen in the preoperative area. The risks, benefits, complications, treatment options, non-operative alternatives, expected recovery and outcomes were discussed with the patient. The possibilities of reaction to medication, pulmonary aspiration, injury to surrounding structures, bleeding, recurrent infection,  the need for additional procedures, failure to diagnose a condition, and creating a complication requiring transfusion or operation were discussed with the patient. The patient concurred with the proposed plan, giving informed consent.  The site of surgery was properly noted/marked if necessary per policy. The patient has been actively warmed in preoperative area.     Pre Op dx: Elevated PSA and Abnormal MRI of the prostate    Post Op Dx: SAME    Procedure: MRI Guided Fusion Bx of the prostate    Physician: ESTELA    Anesthesia: MAC    Estimated Blood Loss: Minimal    Complications: NONE    Indications and Consent: Patient present for prostate Biopsy of lesion found on MRI. After the risks,, benefits, and indications were explained he consented to the procedure.    PROCEDURE: After adequate sedation was obtained the ultrasound probe was inserted into the rectum. An ultrasound sweep of the prostate was performed. These images were then fused with the previously obtained MRI images. The lesion(s) were identified. Multiple targeted biopsies were obtained . I also performed standard Sextant biopsies of the right and left lobe of the prostate. The patient tolerated the procedure well.       Follow up 2 weeks for results    Attending Attestation: I was present and scrubbed for the entire procedure.    Gustabo Smith  Phone Number: [phone]

## 2023-11-22 DIAGNOSIS — N20.0 KIDNEY STONES: ICD-10-CM

## 2023-11-24 ENCOUNTER — ANCILLARY PROCEDURE (OUTPATIENT)
Dept: RADIOLOGY | Facility: CLINIC | Age: 78
End: 2023-11-24
Payer: MEDICARE

## 2023-11-24 DIAGNOSIS — N20.0 KIDNEY STONES: ICD-10-CM

## 2023-11-24 PROCEDURE — 74018 RADEX ABDOMEN 1 VIEW: CPT

## 2023-11-24 PROCEDURE — 74018 RADEX ABDOMEN 1 VIEW: CPT | Performed by: RADIOLOGY

## 2023-11-29 LAB
LABORATORY COMMENT REPORT: NORMAL
PATH REPORT.FINAL DX SPEC: NORMAL
PATH REPORT.GROSS SPEC: NORMAL
PATH REPORT.RELEVANT HX SPEC: NORMAL
PATH REPORT.TOTAL CANCER: NORMAL

## 2023-11-30 ENCOUNTER — OFFICE VISIT (OUTPATIENT)
Dept: UROLOGY | Facility: CLINIC | Age: 78
End: 2023-11-30
Payer: MEDICARE

## 2023-11-30 ENCOUNTER — ANCILLARY PROCEDURE (OUTPATIENT)
Dept: RADIOLOGY | Facility: CLINIC | Age: 78
End: 2023-11-30
Payer: MEDICARE

## 2023-11-30 VITALS — RESPIRATION RATE: 16 BRPM | WEIGHT: 223 LBS | BODY MASS INDEX: 28.32 KG/M2

## 2023-11-30 DIAGNOSIS — R94.8 ABNORMAL RADIONUCLIDE BONE SCAN: ICD-10-CM

## 2023-11-30 DIAGNOSIS — R35.1 NOCTURIA: ICD-10-CM

## 2023-11-30 DIAGNOSIS — N52.9 ERECTILE DYSFUNCTION, UNSPECIFIED ERECTILE DYSFUNCTION TYPE: ICD-10-CM

## 2023-11-30 DIAGNOSIS — N13.8 BENIGN PROSTATIC HYPERPLASIA WITH URINARY OBSTRUCTION AND OTHER LOWER URINARY TRACT SYMPTOMS: ICD-10-CM

## 2023-11-30 DIAGNOSIS — R97.20 ELEVATED PSA: ICD-10-CM

## 2023-11-30 DIAGNOSIS — N40.1 BENIGN PROSTATIC HYPERPLASIA WITH URINARY OBSTRUCTION AND OTHER LOWER URINARY TRACT SYMPTOMS: ICD-10-CM

## 2023-11-30 PROCEDURE — 1159F MED LIST DOCD IN RCRD: CPT | Performed by: UROLOGY

## 2023-11-30 PROCEDURE — 72100 X-RAY EXAM L-S SPINE 2/3 VWS: CPT | Performed by: RADIOLOGY

## 2023-11-30 PROCEDURE — 1036F TOBACCO NON-USER: CPT | Performed by: UROLOGY

## 2023-11-30 PROCEDURE — 99213 OFFICE O/P EST LOW 20 MIN: CPT | Performed by: UROLOGY

## 2023-11-30 PROCEDURE — 1126F AMNT PAIN NOTED NONE PRSNT: CPT | Performed by: UROLOGY

## 2023-11-30 PROCEDURE — 72100 X-RAY EXAM L-S SPINE 2/3 VWS: CPT

## 2023-11-30 ASSESSMENT — ENCOUNTER SYMPTOMS
CHILLS: 0
ALLERGIC/IMMUNOLOGIC NEGATIVE: 1
SHORTNESS OF BREATH: 0
DIFFICULTY URINATING: 0
PSYCHIATRIC NEGATIVE: 1
COUGH: 0
EYES NEGATIVE: 1
NAUSEA: 0
FEVER: 0
ENDOCRINE NEGATIVE: 1

## 2023-11-30 NOTE — PROGRESS NOTES
Subjective   Patient ID: Evan Sewell is a 78 y.o. male.    HPI  Patient is here for prostate MRI bx results. Path results showed benign prostatic tissue with chronic inflammation. Most recent PSA was 8.09 on 10/23. Prior PSA was  5.77 (9/22) PRevious PSA was 6.53 on 12/2021. Prior PSA was 6.75 11/20...patient did have a Urine select test 11/18 which showed 38% chance of detecting low grade and a 13% chance of detecting a [place] of 7 or higher..he has had a couple negative bx done in the past..12/13 was most recent bx done...Chronic BPH Sx are mild and stable...No urgency or frequency...weak stream at times...No dysuria..No hematuria...Nocturia 2-3. ...caffeine does worsen sx...He is taking Doxazosin for HTN. . Patient has Hx of Microhematuria and Renal Cyst. Last U/S and cysto was on 6/23. . ED is mild and not an issue       Review of Systems   Constitutional:  Negative for chills and fever.   HENT: Negative.     Eyes: Negative.    Respiratory:  Negative for cough and shortness of breath.    Cardiovascular:  Negative for chest pain and leg swelling.   Gastrointestinal:  Negative for nausea.   Endocrine: Negative.    Genitourinary:  Negative for difficulty urinating.        Negative except for documented in HPI   Allergic/Immunologic: Negative.    Neurological:         Alert & oriented X 3   Hematological:         Denies blood thinners   Psychiatric/Behavioral: Negative.         Objective   Physical Exam  Vitals and nursing note reviewed.   Pulmonary:      Effort: Pulmonary effort is normal.      Breath sounds: Normal breath sounds.   Abdominal:      Palpations: Abdomen is soft.      Tenderness: There is no abdominal tenderness.   Genitourinary:     Comments: Kidneys non palpable bilaterally  Bladder non palpable or tender  Neurological:      Mental Status: He is alert.         Assessment/Plan   Diagnoses and all orders for this visit:  Benign prostatic hyperplasia with urinary obstruction and other lower  urinary tract symptoms  Elevated PSA  Erectile dysfunction, unspecified erectile dysfunction type  Nocturia      All available PSA values reviewed, Options discussed. Questions answered.  MRI reviewed  Path report reviewed. No Malignancy  Options discussed. Pros/cons of tx options reviewed. Questions answered     Diet changes for prostate health discussed and educational information given. Pros/Cons of prostate health supplements discussed.   Treatment options for LUTS reviewed  Discussed timed voiding. Discussed fluid and caffeine intake  Treatment options for ED reviewed.  Lifestyle change to help prevent UTIs discussed. Encouraged fluid intake.    F/U virtual with Lumbar spine films

## 2023-12-02 ASSESSMENT — ENCOUNTER SYMPTOMS
ALLERGIC/IMMUNOLOGIC NEGATIVE: 1
COUGH: 0
NAUSEA: 0
FEVER: 0
SHORTNESS OF BREATH: 0
ENDOCRINE NEGATIVE: 1
CHILLS: 0
DIFFICULTY URINATING: 0
PSYCHIATRIC NEGATIVE: 1
EYES NEGATIVE: 1

## 2023-12-02 NOTE — PROGRESS NOTES
Subjective   Patient ID: Evan Sewell is a 78 y.o. male.    HPI  Patient is here for Lumbar films results.  Hx of elevated PSA. Path results showed benign prostatic tissue with chronic inflammation. Most recent PSA was 8.09 on 10/23. Prior PSA was  5.77 (9/22) PRevious PSA was 6.53 on 12/2021. Prior PSA was 6.75 11/20...patient did have a Urine select test 11/18 which showed 38% chance of detecting low grade and a 13% chance of detecting a [place] of 7 or higher..he has had a couple negative bx done in the past..12/13 was most recent bx done...Chronic BPH Sx are mild and stable...No urgency or frequency...weak stream at times...No dysuria..No hematuria...Nocturia 2-3. ...caffeine does worsen sx...He is taking Doxazosin for HTN. . Patient has Hx of Microhematuria and Renal Cyst. Last U/S and cysto was on 6/23. . ED is mild and not an issue          Review of Systems   Constitutional:  Negative for chills and fever.   HENT: Negative.     Eyes: Negative.    Respiratory:  Negative for cough and shortness of breath.    Cardiovascular:  Negative for chest pain and leg swelling.   Gastrointestinal:  Negative for nausea.   Endocrine: Negative.    Genitourinary:  Negative for difficulty urinating.        Negative except for documented in HPI   Allergic/Immunologic: Negative.    Neurological:         Alert & oriented X 3   Hematological:         Denies blood thinners   Psychiatric/Behavioral: Negative.         Objective   Physical Exam  No PE done given the virtual nature of visit.     Assessment/Plan   There are no diagnoses linked to this encounter.    All available PSA values reviewed, Options discussed. Questions answered.  MRI and Bx results reviewed   Diet changes for prostate health discussed and educational information given. Pros/Cons of prostate health supplements discussed.   Treatment options for LUTS reviewed  Discussed timed voiding. Discussed fluid and caffeine intake  Treatment options for ED  reviewed.  Lifestyle change to help prevent UTIs discussed. Encouraged fluid intake.    F/U with PSA 4/24 with PSA

## 2023-12-04 ENCOUNTER — TELEMEDICINE (OUTPATIENT)
Dept: UROLOGY | Facility: CLINIC | Age: 78
End: 2023-12-04
Payer: MEDICARE

## 2023-12-04 DIAGNOSIS — N52.9 ERECTILE DYSFUNCTION, UNSPECIFIED ERECTILE DYSFUNCTION TYPE: ICD-10-CM

## 2023-12-04 DIAGNOSIS — R97.20 ELEVATED PSA: ICD-10-CM

## 2023-12-04 DIAGNOSIS — R35.1 NOCTURIA: ICD-10-CM

## 2023-12-04 PROCEDURE — 99213 OFFICE O/P EST LOW 20 MIN: CPT | Performed by: UROLOGY

## 2024-04-11 ENCOUNTER — LAB (OUTPATIENT)
Dept: LAB | Facility: LAB | Age: 79
End: 2024-04-11
Payer: MEDICARE

## 2024-04-11 DIAGNOSIS — R97.20 ELEVATED PSA: ICD-10-CM

## 2024-04-11 LAB — PSA SERPL-MCNC: 8.36 NG/ML

## 2024-04-11 PROCEDURE — 84153 ASSAY OF PSA TOTAL: CPT

## 2024-04-11 PROCEDURE — 36415 COLL VENOUS BLD VENIPUNCTURE: CPT

## 2024-04-16 ASSESSMENT — ENCOUNTER SYMPTOMS
ENDOCRINE NEGATIVE: 1
FEVER: 0
CHILLS: 0
SHORTNESS OF BREATH: 0
NAUSEA: 0
COUGH: 0
PSYCHIATRIC NEGATIVE: 1
EYES NEGATIVE: 1
DIFFICULTY URINATING: 0
ALLERGIC/IMMUNOLOGIC NEGATIVE: 1

## 2024-04-16 NOTE — PROGRESS NOTES
Subjective   Patient ID: Evan Sewell is a 79 y.o. male.    HPI  Hx of elevated PSA. Patient had a MRI fusion Bx 11/23 and Path results showed benign prostatic tissue with chronic inflammation. Most recent PSA was 8.36 ON 4/24.  Prior PSA was  8.09 on 10/23. Prior PSA was  5.77 (9/22) PRevious PSA was 6.53 on 12/2021. Prior PSA was 6.75 11/20...patient did have a Urine select test 11/18 which showed 38% chance of detecting low grade and a 13% chance of detecting a [place] of 7 or higher..he has had a couple negative bx done in the past dating back to 12/13. ...Chronic BPH Sx are mild and stable...No urgency or frequency...weak stream at times...No dysuria..No hematuria...Nocturia 2-3. ...caffeine does worsen sx...He is taking Doxazosin for HTN. . Patient has Hx of Microhematuria and Renal Cyst. Last U/S and cysto was on 6/23. . ED is chronic and not an issue          Review of Systems   Constitutional:  Negative for chills and fever.   HENT: Negative.     Eyes: Negative.    Respiratory:  Negative for cough and shortness of breath.    Cardiovascular:  Negative for chest pain and leg swelling.   Gastrointestinal:  Negative for nausea.   Endocrine: Negative.    Genitourinary:  Negative for difficulty urinating.        Negative except for documented in HPI   Allergic/Immunologic: Negative.    Neurological:         Alert & oriented X 3   Hematological:         Denies blood thinners   Psychiatric/Behavioral: Negative.         Objective   Physical Exam  Vitals and nursing note reviewed.   Constitutional:       General: He is not in acute distress.     Appearance: Normal appearance.   Pulmonary:      Effort: Pulmonary effort is normal.   Abdominal:      Tenderness: There is no abdominal tenderness.   Genitourinary:     Comments: Kidneys non palpable bilaterally  Bladder non palpable or tender  Scrotum no mass, No hydrocele  Epididymis- No spermatocele. Non Tender.  Testicles: No mass. Soft with left atrophy  Urethra:  No discharge  Penis within normal limits... No lesions. circumcised  Prostate - symmetric, no nodules. BENIGN  Seminal Vesicals: No mass.  Sphincter tone: normal  Neurological:      Mental Status: He is alert.         Assessment/Plan   Diagnoses and all orders for this visit:  Benign prostatic hyperplasia with urinary obstruction and other lower urinary tract symptoms  Elevated PSA  Erectile dysfunction, unspecified erectile dysfunction type  Nocturia      All available PSA values reviewed, Options discussed. Questions answered.  MRI reviewed  Discussed repeat MRI if PSA rises  Pros and cons of prostate biopsy reviewed. Other options discussed. Questions answered  Path report reviewed. NO MALIGNANCY.Options discussed. Pros/cons of tx options reviewed. Questions answered  Will recheck PSA in 6 months   Diet changes for prostate health discussed and educational information given. Pros/Cons of prostate health supplements discussed.   Treatment options for LUTS reviewed  Continue Cardura  Discussed timed voiding. Discussed fluid and caffeine intake  Treatment options for ED reviewed.  Lifestyle change to help prevent UTIs discussed. Encouraged fluid intake.    F/U  6 months with PSA and UA

## 2024-04-18 ENCOUNTER — OFFICE VISIT (OUTPATIENT)
Dept: UROLOGY | Facility: CLINIC | Age: 79
End: 2024-04-18
Payer: MEDICARE

## 2024-04-18 VITALS — BODY MASS INDEX: 29.52 KG/M2 | WEIGHT: 230 LBS | RESPIRATION RATE: 16 BRPM | HEIGHT: 74 IN

## 2024-04-18 DIAGNOSIS — R35.1 NOCTURIA: ICD-10-CM

## 2024-04-18 DIAGNOSIS — N13.8 BENIGN PROSTATIC HYPERPLASIA WITH URINARY OBSTRUCTION AND OTHER LOWER URINARY TRACT SYMPTOMS: ICD-10-CM

## 2024-04-18 DIAGNOSIS — R97.20 ELEVATED PSA: ICD-10-CM

## 2024-04-18 DIAGNOSIS — N40.1 BENIGN PROSTATIC HYPERPLASIA WITH URINARY OBSTRUCTION AND OTHER LOWER URINARY TRACT SYMPTOMS: ICD-10-CM

## 2024-04-18 DIAGNOSIS — N52.9 ERECTILE DYSFUNCTION, UNSPECIFIED ERECTILE DYSFUNCTION TYPE: ICD-10-CM

## 2024-04-18 PROCEDURE — 1159F MED LIST DOCD IN RCRD: CPT | Performed by: UROLOGY

## 2024-04-18 PROCEDURE — 99214 OFFICE O/P EST MOD 30 MIN: CPT | Performed by: UROLOGY

## 2024-04-18 PROCEDURE — 1036F TOBACCO NON-USER: CPT | Performed by: UROLOGY

## 2024-04-18 RX ORDER — AMLODIPINE BESYLATE 10 MG/1
10 TABLET ORAL DAILY
COMMUNITY
Start: 2024-01-19

## 2024-07-16 ENCOUNTER — APPOINTMENT (OUTPATIENT)
Dept: URBAN - METROPOLITAN AREA CLINIC 204 | Age: 79
Setting detail: DERMATOLOGY
End: 2024-07-17

## 2024-07-16 PROCEDURE — 99213 OFFICE O/P EST LOW 20 MIN: CPT | Mod: 25

## 2024-07-16 PROCEDURE — 17000 DESTRUCT PREMALG LESION: CPT

## 2024-07-16 PROCEDURE — OTHER MIPS QUALITY: OTHER

## 2024-07-16 PROCEDURE — 17003 DESTRUCT PREMALG LES 2-14: CPT

## 2024-07-16 NOTE — PROCEDURE: MIPS QUALITY
Detail Level: Detailed
Quality 226: Preventive Care And Screening: Tobacco Use: Screening And Cessation Intervention: Patient screened for tobacco use and is an ex/non-smoker
Additional Notes: Documentation for MIPS  purposes only. Full Patient visit note from paper chart is available for review and also scanned in EMA chart.
Quality 47: Advance Care Plan: Advance Care Planning discussed and documented; advance care plan or surrogate decision maker documented in the medical record.
Quality 137: Melanoma: Continuity Of Care - Recall System: Patient information entered into a recall system that includes: target date for the next exam specified AND a process to follow up with patients regarding missed or unscheduled appointments

## 2024-10-17 ENCOUNTER — LAB (OUTPATIENT)
Dept: LAB | Facility: LAB | Age: 79
End: 2024-10-17
Payer: MEDICARE

## 2024-10-17 ENCOUNTER — APPOINTMENT (OUTPATIENT)
Dept: UROLOGY | Facility: CLINIC | Age: 79
End: 2024-10-17
Payer: MEDICARE

## 2024-10-17 DIAGNOSIS — R35.1 NOCTURIA: ICD-10-CM

## 2024-10-17 DIAGNOSIS — R97.20 ELEVATED PSA: ICD-10-CM

## 2024-10-17 LAB
CREAT SERPL-MCNC: 1.35 MG/DL (ref 0.5–1.3)
EGFRCR SERPLBLD CKD-EPI 2021: 53 ML/MIN/1.73M*2
PSA SERPL-MCNC: 10.25 NG/ML

## 2024-10-17 PROCEDURE — 84153 ASSAY OF PSA TOTAL: CPT

## 2024-10-17 PROCEDURE — 82565 ASSAY OF CREATININE: CPT

## 2024-10-17 ASSESSMENT — ENCOUNTER SYMPTOMS
ALLERGIC/IMMUNOLOGIC NEGATIVE: 1
EYES NEGATIVE: 1
PSYCHIATRIC NEGATIVE: 1
COUGH: 0
CHILLS: 0
ENDOCRINE NEGATIVE: 1
DIFFICULTY URINATING: 0
FEVER: 0
SHORTNESS OF BREATH: 0
NAUSEA: 0

## 2024-10-17 NOTE — PROGRESS NOTES
Subjective   Patient ID: Evan Sewell is a 79 y.o. male.    HPI Patient is here for a 6 month follow up for Hx of elevated PSA. Most recent PSA was 10.25 on 10/24.  . Previous PSA was 8.36 (4/24).  Prior PSA was  8.09 on 10/23. Prior PSA was 5.77 (9/22) PRevious PSA was 6.53 on 12/2021. Prior PSA was 6.75 11/20...He hade negative MRI fusion bx on 11/23. patient did have a Urine select test 11/18 which showed 38% chance of detecting low grade and a 13% chance of detecting a [place] of 7 or higher..he has had a couple negative bx done in the past dating back to 12/13. ...Chronic BPH Sx are mild and stable...No urgency or frequency...weak stream at times...No dysuria..No hematuria...Nocturia 2-3. ...caffeine does worsen sx...He is taking Doxazosin for HTN. . Patient has Hx of Microhematuria and Renal Cyst. Last U/S and cysto was on 6/23. . ED is chronic and not an issue        Review of Systems   Constitutional:  Negative for chills and fever.   HENT: Negative.     Eyes: Negative.    Respiratory:  Negative for cough and shortness of breath.    Cardiovascular:  Negative for chest pain and leg swelling.   Gastrointestinal:  Negative for nausea.   Endocrine: Negative.    Genitourinary:  Negative for difficulty urinating.        Negative except for documented in HPI   Allergic/Immunologic: Negative.    Neurological:         Alert & oriented X 3   Hematological:         Denies blood thinners   Psychiatric/Behavioral: Negative.         Objective   Physical Exam  Vitals and nursing note reviewed.   Constitutional:       General: He is not in acute distress.     Appearance: Normal appearance.   Pulmonary:      Effort: Pulmonary effort is normal.   Abdominal:      Tenderness: There is no abdominal tenderness.   Genitourinary:     Comments: Kidneys non palpable bilaterally  Bladder non palpable or tender  Scrotum no mass, No hydrocele  Epididymis- No spermatocele. Non Tender.  Testicles: No mass. Soft  Urethra: No  discharge  Penis within normal limits... No lesions. circumcised  Prostate - symmetric, no nodules. BENIGN  Seminal Vesicals: No mass.  Sphincter tone: normal  Neurological:      Mental Status: He is alert.         Assessment/Plan       Diagnoses and all orders for this visit:  Elevated PSA  Erectile dysfunction, unspecified erectile dysfunction type  Nocturia  Benign prostatic hyperplasia with urinary obstruction and other lower urinary tract symptoms    All available PSA values reviewed, Options discussed. Questions answered.  Past MRI reviewed-Discussed repeat MRI  Pros and cons of prostate biopsy reviewed. Other options discussed. Questions answered  Past Bx reviewed  Will treat with Antibiotics and recheck PSA in 4 weeks-I am only doing this given UA results   Diet changes for prostate health discussed and educational information given. Pros/Cons of prostate health supplements discussed.   Treatment options for LUTS reviewed  Discussed timed voiding. Discussed fluid and caffeine intake  Treatment options for ED reviewed-Observe  Lifestyle change to help prevent UTIs discussed. Encouraged fluid intake.  UA reviewed-Positive  Bactrim Rx given    F/U 1 month with PSA

## 2024-10-24 ENCOUNTER — APPOINTMENT (OUTPATIENT)
Dept: UROLOGY | Facility: CLINIC | Age: 79
End: 2024-10-24
Payer: MEDICARE

## 2024-10-24 VITALS
BODY MASS INDEX: 29.27 KG/M2 | SYSTOLIC BLOOD PRESSURE: 141 MMHG | HEART RATE: 64 BPM | WEIGHT: 228 LBS | DIASTOLIC BLOOD PRESSURE: 82 MMHG

## 2024-10-24 DIAGNOSIS — N40.1 BENIGN PROSTATIC HYPERPLASIA WITH URINARY OBSTRUCTION AND OTHER LOWER URINARY TRACT SYMPTOMS: ICD-10-CM

## 2024-10-24 DIAGNOSIS — R35.1 NOCTURIA: ICD-10-CM

## 2024-10-24 DIAGNOSIS — R97.20 ELEVATED PSA: ICD-10-CM

## 2024-10-24 DIAGNOSIS — N13.8 BENIGN PROSTATIC HYPERPLASIA WITH URINARY OBSTRUCTION AND OTHER LOWER URINARY TRACT SYMPTOMS: ICD-10-CM

## 2024-10-24 DIAGNOSIS — N52.9 ERECTILE DYSFUNCTION, UNSPECIFIED ERECTILE DYSFUNCTION TYPE: ICD-10-CM

## 2024-10-24 LAB
POC APPEARANCE, URINE: CLEAR
POC BILIRUBIN, URINE: NEGATIVE
POC BLOOD, URINE: ABNORMAL
POC COLOR, URINE: YELLOW
POC GLUCOSE, URINE: NEGATIVE MG/DL
POC KETONES, URINE: NEGATIVE MG/DL
POC LEUKOCYTES, URINE: ABNORMAL
POC NITRITE,URINE: POSITIVE
POC PH, URINE: 6 PH
POC PROTEIN, URINE: ABNORMAL MG/DL
POC SPECIFIC GRAVITY, URINE: 1.02
POC UROBILINOGEN, URINE: 0.2 EU/DL

## 2024-10-24 PROCEDURE — 81003 URINALYSIS AUTO W/O SCOPE: CPT | Performed by: UROLOGY

## 2024-10-24 PROCEDURE — 1159F MED LIST DOCD IN RCRD: CPT | Performed by: UROLOGY

## 2024-10-24 PROCEDURE — 99214 OFFICE O/P EST MOD 30 MIN: CPT | Performed by: UROLOGY

## 2024-10-24 PROCEDURE — 1036F TOBACCO NON-USER: CPT | Performed by: UROLOGY

## 2024-10-24 RX ORDER — SULFAMETHOXAZOLE AND TRIMETHOPRIM 800; 160 MG/1; MG/1
1 TABLET ORAL DAILY
Qty: 30 TABLET | Refills: 0 | Status: SHIPPED | OUTPATIENT
Start: 2024-10-24 | End: 2024-11-23

## 2024-11-14 ENCOUNTER — LAB (OUTPATIENT)
Dept: LAB | Facility: LAB | Age: 79
End: 2024-11-14
Payer: MEDICARE

## 2024-11-14 DIAGNOSIS — R97.20 ELEVATED PSA: ICD-10-CM

## 2024-11-14 LAB — PSA SERPL-MCNC: 11.43 NG/ML

## 2024-11-14 PROCEDURE — 84153 ASSAY OF PSA TOTAL: CPT

## 2024-11-14 PROCEDURE — 36415 COLL VENOUS BLD VENIPUNCTURE: CPT

## 2024-11-19 ASSESSMENT — ENCOUNTER SYMPTOMS
DIFFICULTY URINATING: 0
COUGH: 0
ALLERGIC/IMMUNOLOGIC NEGATIVE: 1
NAUSEA: 0
SHORTNESS OF BREATH: 0
EYES NEGATIVE: 1
ENDOCRINE NEGATIVE: 1
FEVER: 0
CHILLS: 0
PSYCHIATRIC NEGATIVE: 1

## 2024-11-19 NOTE — PROGRESS NOTES
Subjective   Patient ID: Evan Sewell is a 79 y.o. male.    HPI   Hx of elevated PSA. Most recent PSA was  11.43 on 11/24. Prior PSA was 10.25 on 10/24.  . Previous PSA was 8.36 (4/24).  Prior PSA was  8.09 on 10/23. Prior PSA was 5.77 (9/22) PRevious PSA was 6.53 on 12/2021. Prior PSA was 6.75 11/20...He hade negative MRI fusion bx on 11/23. patient did have a Urine select test 11/18 which showed 38% chance of detecting low grade and a 13% chance of detecting a [place] of 7 or higher..he has had a couple negative bx done in the past dating back to 12/13. ...Chronic BPH Sx are mild and stable...No urgency or frequency...weak stream at times...No dysuria..No hematuria...Nocturia 2-3. ...caffeine does worsen sx...He is taking Doxazosin for HTN. . Patient has Hx of Microhematuria and Renal Cyst. Last U/S and cysto was on 6/23. . ED is chronic and not an issue        Review of Systems   Constitutional:  Negative for chills and fever.   HENT: Negative.     Eyes: Negative.    Respiratory:  Negative for cough and shortness of breath.    Cardiovascular:  Negative for chest pain and leg swelling.   Gastrointestinal:  Negative for nausea.   Endocrine: Negative.    Genitourinary:  Negative for difficulty urinating.        Negative except for documented in HPI   Allergic/Immunologic: Negative.    Neurological:         Alert & oriented X 3   Hematological:         Denies blood thinners   Psychiatric/Behavioral: Negative.         Objective   Physical Exam  Vitals and nursing note reviewed.   Pulmonary:      Effort: Pulmonary effort is normal.   Abdominal:      Palpations: Abdomen is soft.      Tenderness: There is no abdominal tenderness.   Genitourinary:     Comments: Kidneys non palpable bilaterally  Bladder non palpable or tender  Neurological:      Mental Status: He is alert.         Assessment/Plan   Diagnoses and all orders for this visit:  Elevated PSA  Nocturia  Erectile dysfunction, unspecified erectile dysfunction  type  Benign prostatic hyperplasia with urinary obstruction and other lower urinary tract symptoms      All available PSA values reviewed, Options discussed. Questions answered.  Past MRI reviewed  New MRI ordered  Pros and cons of prostate biopsy reviewed. Other options discussed. Questions answered  Past Bx results reviewed   Diet changes for prostate health discussed and educational information given. Pros/Cons of prostate health supplements discussed.   Treatment options for LUTS reviewed  F/U after prostate MRI

## 2024-11-21 ENCOUNTER — APPOINTMENT (OUTPATIENT)
Dept: UROLOGY | Facility: CLINIC | Age: 79
End: 2024-11-21
Payer: MEDICARE

## 2024-11-21 VITALS
DIASTOLIC BLOOD PRESSURE: 90 MMHG | SYSTOLIC BLOOD PRESSURE: 179 MMHG | HEIGHT: 74 IN | HEART RATE: 86 BPM | WEIGHT: 225 LBS | BODY MASS INDEX: 28.88 KG/M2

## 2024-11-21 DIAGNOSIS — N40.1 BENIGN PROSTATIC HYPERPLASIA WITH URINARY OBSTRUCTION AND OTHER LOWER URINARY TRACT SYMPTOMS: ICD-10-CM

## 2024-11-21 DIAGNOSIS — N13.8 BENIGN PROSTATIC HYPERPLASIA WITH URINARY OBSTRUCTION AND OTHER LOWER URINARY TRACT SYMPTOMS: ICD-10-CM

## 2024-11-21 DIAGNOSIS — R97.20 ELEVATED PSA: ICD-10-CM

## 2024-11-21 DIAGNOSIS — R35.1 NOCTURIA: ICD-10-CM

## 2024-11-21 DIAGNOSIS — N52.9 ERECTILE DYSFUNCTION, UNSPECIFIED ERECTILE DYSFUNCTION TYPE: ICD-10-CM

## 2024-11-21 PROCEDURE — 1036F TOBACCO NON-USER: CPT | Performed by: UROLOGY

## 2024-11-21 PROCEDURE — 1159F MED LIST DOCD IN RCRD: CPT | Performed by: UROLOGY

## 2024-11-21 PROCEDURE — 99213 OFFICE O/P EST LOW 20 MIN: CPT | Performed by: UROLOGY

## 2024-12-12 ENCOUNTER — HOSPITAL ENCOUNTER (OUTPATIENT)
Dept: RADIOLOGY | Facility: HOSPITAL | Age: 79
Discharge: HOME | End: 2024-12-12
Payer: MEDICARE

## 2024-12-12 DIAGNOSIS — R97.20 ELEVATED PSA: ICD-10-CM

## 2024-12-12 PROCEDURE — 72197 MRI PELVIS W/O & W/DYE: CPT

## 2024-12-12 PROCEDURE — 2550000001 HC RX 255 CONTRASTS: Performed by: UROLOGY

## 2024-12-12 PROCEDURE — A9575 INJ GADOTERATE MEGLUMI 0.1ML: HCPCS | Performed by: UROLOGY

## 2024-12-12 RX ORDER — GADOTERATE MEGLUMINE 376.9 MG/ML
20 INJECTION INTRAVENOUS
Status: COMPLETED | OUTPATIENT
Start: 2024-12-12 | End: 2024-12-12

## 2024-12-17 ASSESSMENT — ENCOUNTER SYMPTOMS
EYES NEGATIVE: 1
ENDOCRINE NEGATIVE: 1
CHILLS: 0
FEVER: 0
ALLERGIC/IMMUNOLOGIC NEGATIVE: 1
DIFFICULTY URINATING: 0
SHORTNESS OF BREATH: 0
NAUSEA: 0
PSYCHIATRIC NEGATIVE: 1
COUGH: 0

## 2024-12-17 NOTE — PROGRESS NOTES
Subjective   Patient ID: Evan Sewell is a 79 y.o. male.    HPI  Patient is here for prostate Mri results. MRI showed stable PI-RAD 5 lesion. Also showed Stable T2 hyperintense/T1 hypointense enhancing lesions within the left hemisacrum and inferior right pubic ramus. Lesions arem indeterminate significance. Recommend correlation with bone scan. Most recent PSA was  11.43 on 11/24. Prior PSA was 10.25 on 10/24.  . Previous PSA was 8.36 (4/24).  Prior PSA was  8.09 on 10/23. Prior PSA was 5.77 (9/22) PRevious PSA was 6.53 on 12/2021. Prior PSA was 6.75 11/20...He hade negative MRI fusion bx on 11/23. patient did have a Urine select test 11/18 which showed 38% chance of detecting low grade and a 13% chance of detecting a [place] of 7 or higher..he has had a couple negative bx done in the past dating back to 12/13. ...Chronic BPH Sx are mild and stable...No urgency or frequency...weak stream at times...No dysuria..No hematuria...Nocturia 2-3. ...caffeine does worsen sx...He is taking Doxazosin for HTN. . Patient has Hx of Microhematuria and Renal Cyst. Last U/S and cysto was on 6/23. . ED is chronic and not an issue           Review of Systems   Constitutional:  Negative for chills and fever.   HENT: Negative.     Eyes: Negative.    Respiratory:  Negative for cough and shortness of breath.    Cardiovascular:  Negative for chest pain and leg swelling.   Gastrointestinal:  Negative for nausea.   Endocrine: Negative.    Genitourinary:  Negative for difficulty urinating.        Negative except for documented in HPI   Allergic/Immunologic: Negative.    Neurological:         Alert & oriented X 3   Hematological:         Denies blood thinners   Psychiatric/Behavioral: Negative.         Objective   Physical Exam  Vitals and nursing note reviewed.   Pulmonary:      Effort: Pulmonary effort is normal.   Abdominal:      Palpations: Abdomen is soft.      Tenderness: There is no abdominal tenderness.   Genitourinary:      Comments: Kidneys non palpable bilaterally  Bladder non palpable or tender  Neurological:      Mental Status: He is alert.         Assessment/Plan   Diagnoses and all orders for this visit:  Benign prostatic hyperplasia with urinary obstruction and other lower urinary tract symptoms  Erectile dysfunction, unspecified erectile dysfunction type  Nocturia  Elevated PSA      All available PSA values reviewed, Options discussed. Questions answered.  MRI reviewed  Pros and cons of prostate biopsy reviewed. Other options discussed. Questions answered  Past Bx reviewed and repeat Bx scheduled  Discussed Bone scan  Cipro Rx given-[place]   Diet changes for prostate health discussed and educational information given. Pros/Cons of prostate health supplements discussed.   Treatment options for LUTS reviewed  F/U MRI fusion Bx

## 2024-12-18 ENCOUNTER — APPOINTMENT (OUTPATIENT)
Dept: UROLOGY | Facility: CLINIC | Age: 79
End: 2024-12-18
Payer: MEDICARE

## 2024-12-18 VITALS
RESPIRATION RATE: 16 BRPM | SYSTOLIC BLOOD PRESSURE: 144 MMHG | WEIGHT: 227 LBS | BODY MASS INDEX: 29.15 KG/M2 | DIASTOLIC BLOOD PRESSURE: 80 MMHG

## 2024-12-18 DIAGNOSIS — R97.20 ELEVATED PSA: ICD-10-CM

## 2024-12-18 DIAGNOSIS — R35.1 NOCTURIA: ICD-10-CM

## 2024-12-18 DIAGNOSIS — N13.8 BENIGN PROSTATIC HYPERPLASIA WITH URINARY OBSTRUCTION AND OTHER LOWER URINARY TRACT SYMPTOMS: ICD-10-CM

## 2024-12-18 DIAGNOSIS — N52.9 ERECTILE DYSFUNCTION, UNSPECIFIED ERECTILE DYSFUNCTION TYPE: ICD-10-CM

## 2024-12-18 DIAGNOSIS — N40.1 BENIGN PROSTATIC HYPERPLASIA WITH URINARY OBSTRUCTION AND OTHER LOWER URINARY TRACT SYMPTOMS: ICD-10-CM

## 2024-12-18 PROCEDURE — 1159F MED LIST DOCD IN RCRD: CPT | Performed by: UROLOGY

## 2024-12-18 PROCEDURE — 99214 OFFICE O/P EST MOD 30 MIN: CPT | Performed by: UROLOGY

## 2024-12-18 PROCEDURE — 1036F TOBACCO NON-USER: CPT | Performed by: UROLOGY

## 2024-12-18 RX ORDER — CIPROFLOXACIN 500 MG/1
500 TABLET ORAL 2 TIMES DAILY
Qty: 6 TABLET | Refills: 0 | Status: SHIPPED | OUTPATIENT
Start: 2024-12-18 | End: 2024-12-21

## 2024-12-23 ENCOUNTER — PREP FOR PROCEDURE (OUTPATIENT)
Dept: UROLOGY | Facility: CLINIC | Age: 79
End: 2024-12-23
Payer: MEDICARE

## 2024-12-23 DIAGNOSIS — R97.20 ELEVATED PSA: ICD-10-CM

## 2025-01-09 NOTE — PREPROCEDURE INSTRUCTIONS
No outpatient medications have been marked as taking for the 1/14/25 encounter (Hospital Encounter).                       NPO Instructions:    Do not eat any food after midnight the night before your surgery/procedure.  You may have clear liquids until TWO hours before surgery/procedure. This includes water, black tea/coffee, (no milk or cream) apple juice and electrolyte drinks (Gatorade).    Additional Instructions:     Will need  home, will receive call day before surgery with arrival time

## 2025-01-13 ASSESSMENT — ENCOUNTER SYMPTOMS
SHORTNESS OF BREATH: 0
FEVER: 0
NAUSEA: 0
PSYCHIATRIC NEGATIVE: 1
COUGH: 0
ALLERGIC/IMMUNOLOGIC NEGATIVE: 1
DIFFICULTY URINATING: 0
CHILLS: 0
ENDOCRINE NEGATIVE: 1
EYES NEGATIVE: 1

## 2025-01-13 NOTE — H&P
History Of Present Illness  Evan Sewell is a 79 y.o. male presenting with elevated PSA.     Past Medical History  Past Medical History:   Diagnosis Date    Anxiety and depression     BPH (benign prostatic hyperplasia)     Elevated PSA 2000    GERD (gastroesophageal reflux disease)     Hypertension     Personal history of other diseases of male genital organs     History of prostatitis    Personal history of other diseases of urinary system     History of hematuria       Surgical History  Past Surgical History:   Procedure Laterality Date    BACK SURGERY      OTHER SURGICAL HISTORY  12/03/2019    Corneal lasik    OTHER SURGICAL HISTORY  12/03/2019    Knee surgery    VASECTOMY  1986        Social History  He reports that he has never smoked. He has never used smokeless tobacco. He reports that he does not currently use alcohol. He reports that he does not use drugs.    Family History  Family History   Problem Relation Name Age of Onset    Hypertension Other          Allergies  Patient has no known allergies.    Review of Systems   Constitutional:  Negative for chills and fever.   HENT: Negative.     Eyes: Negative.    Respiratory:  Negative for cough and shortness of breath.    Cardiovascular:  Negative for chest pain and leg swelling.   Gastrointestinal:  Negative for nausea.   Endocrine: Negative.    Genitourinary:  Negative for difficulty urinating.        Negative except for documented in HPI   Allergic/Immunologic: Negative.    Neurological:         Alert & oriented X 3   Hematological:         Denies blood thinners   Psychiatric/Behavioral: Negative.          Physical Exam  Vitals and nursing note reviewed.   Pulmonary:      Effort: Pulmonary effort is normal.   Abdominal:      Palpations: Abdomen is soft.      Tenderness: There is no abdominal tenderness.   Genitourinary:     Comments: Kidneys non palpable bilaterally  Bladder non palpable or tender  Neurological:      Mental Status: He is alert.           Last Recorded Vitals  Weight 103 kg (227 lb).      Assessment/Plan   Assessment & Plan  Elevated PSA          Gustabo Smith MD

## 2025-01-14 ENCOUNTER — ANESTHESIA (OUTPATIENT)
Dept: OPERATING ROOM | Facility: HOSPITAL | Age: 80
End: 2025-01-14
Payer: MEDICARE

## 2025-01-14 ENCOUNTER — ANESTHESIA EVENT (OUTPATIENT)
Dept: OPERATING ROOM | Facility: HOSPITAL | Age: 80
End: 2025-01-14
Payer: MEDICARE

## 2025-01-14 ENCOUNTER — HOSPITAL ENCOUNTER (OUTPATIENT)
Facility: HOSPITAL | Age: 80
Setting detail: OUTPATIENT SURGERY
Discharge: HOME | End: 2025-01-14
Attending: UROLOGY | Admitting: UROLOGY
Payer: MEDICARE

## 2025-01-14 VITALS
OXYGEN SATURATION: 97 % | WEIGHT: 227.29 LBS | DIASTOLIC BLOOD PRESSURE: 87 MMHG | HEIGHT: 74 IN | BODY MASS INDEX: 29.17 KG/M2 | SYSTOLIC BLOOD PRESSURE: 126 MMHG | HEART RATE: 75 BPM | RESPIRATION RATE: 16 BRPM | TEMPERATURE: 97 F

## 2025-01-14 DIAGNOSIS — R97.20 ELEVATED PSA: Primary | ICD-10-CM

## 2025-01-14 PROCEDURE — G0416 PROSTATE BIOPSY, ANY MTHD: HCPCS | Performed by: PATHOLOGY

## 2025-01-14 PROCEDURE — 76872 US TRANSRECTAL: CPT | Performed by: UROLOGY

## 2025-01-14 PROCEDURE — 88344 IMHCHEM/IMCYTCHM EA MLT ANTB: CPT | Mod: TC,SAMLAB | Performed by: UROLOGY

## 2025-01-14 PROCEDURE — 2720000007 HC OR 272 NO HCPCS: Performed by: UROLOGY

## 2025-01-14 PROCEDURE — 88344 IMHCHEM/IMCYTCHM EA MLT ANTB: CPT | Performed by: PATHOLOGY

## 2025-01-14 PROCEDURE — 55700 PR PROSTATE NEEDLE BIOPSY ANY APPROACH: CPT | Performed by: UROLOGY

## 2025-01-14 PROCEDURE — 2500000004 HC RX 250 GENERAL PHARMACY W/ HCPCS (ALT 636 FOR OP/ED): Performed by: ANESTHESIOLOGY

## 2025-01-14 PROCEDURE — 3600000009 HC OR TIME - EACH INCREMENTAL 1 MINUTE - PROCEDURE LEVEL FOUR: Performed by: UROLOGY

## 2025-01-14 PROCEDURE — 2500000001 HC RX 250 WO HCPCS SELF ADMINISTERED DRUGS (ALT 637 FOR MEDICARE OP): Performed by: ANESTHESIOLOGY

## 2025-01-14 PROCEDURE — 7100000009 HC PHASE TWO TIME - INITIAL BASE CHARGE: Performed by: UROLOGY

## 2025-01-14 PROCEDURE — 7100000010 HC PHASE TWO TIME - EACH INCREMENTAL 1 MINUTE: Performed by: UROLOGY

## 2025-01-14 PROCEDURE — 76942 ECHO GUIDE FOR BIOPSY: CPT | Performed by: UROLOGY

## 2025-01-14 PROCEDURE — 3700000002 HC GENERAL ANESTHESIA TIME - EACH INCREMENTAL 1 MINUTE: Performed by: UROLOGY

## 2025-01-14 PROCEDURE — 3700000001 HC GENERAL ANESTHESIA TIME - INITIAL BASE CHARGE: Performed by: UROLOGY

## 2025-01-14 PROCEDURE — C1819 TISSUE LOCALIZATION-EXCISION: HCPCS | Performed by: UROLOGY

## 2025-01-14 PROCEDURE — 3600000004 HC OR TIME - INITIAL BASE CHARGE - PROCEDURE LEVEL FOUR: Performed by: UROLOGY

## 2025-01-14 RX ORDER — MORPHINE SULFATE 4 MG/ML
4 INJECTION, SOLUTION INTRAMUSCULAR; INTRAVENOUS EVERY 5 MIN PRN
Status: CANCELLED | OUTPATIENT
Start: 2025-01-14

## 2025-01-14 RX ORDER — OXYCODONE HYDROCHLORIDE 5 MG/1
5 TABLET ORAL EVERY 4 HOURS PRN
Status: CANCELLED | OUTPATIENT
Start: 2025-01-14

## 2025-01-14 RX ORDER — MORPHINE SULFATE 4 MG/ML
2 INJECTION, SOLUTION INTRAMUSCULAR; INTRAVENOUS EVERY 5 MIN PRN
Status: CANCELLED | OUTPATIENT
Start: 2025-01-14

## 2025-01-14 RX ORDER — HYDROCODONE BITARTRATE AND ACETAMINOPHEN 5; 325 MG/1; MG/1
1 TABLET ORAL EVERY 6 HOURS PRN
Qty: 20 TABLET | Refills: 0 | Status: SHIPPED | OUTPATIENT
Start: 2025-01-14

## 2025-01-14 RX ORDER — SODIUM CHLORIDE, SODIUM LACTATE, POTASSIUM CHLORIDE, CALCIUM CHLORIDE 600; 310; 30; 20 MG/100ML; MG/100ML; MG/100ML; MG/100ML
100 INJECTION, SOLUTION INTRAVENOUS CONTINUOUS
Status: CANCELLED | OUTPATIENT
Start: 2025-01-14 | End: 2025-01-18

## 2025-01-14 RX ORDER — PROPOFOL 10 MG/ML
INJECTION, EMULSION INTRAVENOUS CONTINUOUS PRN
Status: DISCONTINUED | OUTPATIENT
Start: 2025-01-14 | End: 2025-01-14

## 2025-01-14 RX ORDER — ACETAMINOPHEN 325 MG/1
975 TABLET ORAL ONCE
Status: COMPLETED | OUTPATIENT
Start: 2025-01-14 | End: 2025-01-14

## 2025-01-14 RX ORDER — ONDANSETRON HYDROCHLORIDE 2 MG/ML
4 INJECTION, SOLUTION INTRAVENOUS ONCE AS NEEDED
Status: CANCELLED | OUTPATIENT
Start: 2025-01-14

## 2025-01-14 RX ORDER — LABETALOL HYDROCHLORIDE 5 MG/ML
5 INJECTION, SOLUTION INTRAVENOUS ONCE AS NEEDED
Status: CANCELLED | OUTPATIENT
Start: 2025-01-14

## 2025-01-14 RX ADMIN — PROPOFOL 90 MCG/KG/MIN: 10 INJECTION, EMULSION INTRAVENOUS at 09:07

## 2025-01-14 RX ADMIN — ACETAMINOPHEN 975 MG: 325 TABLET ORAL at 07:46

## 2025-01-14 ASSESSMENT — PAIN SCALES - GENERAL
PAINLEVEL_OUTOF10: 0 - NO PAIN

## 2025-01-14 ASSESSMENT — COLUMBIA-SUICIDE SEVERITY RATING SCALE - C-SSRS
1. IN THE PAST MONTH, HAVE YOU WISHED YOU WERE DEAD OR WISHED YOU COULD GO TO SLEEP AND NOT WAKE UP?: NO
6. HAVE YOU EVER DONE ANYTHING, STARTED TO DO ANYTHING, OR PREPARED TO DO ANYTHING TO END YOUR LIFE?: NO
2. HAVE YOU ACTUALLY HAD ANY THOUGHTS OF KILLING YOURSELF?: NO

## 2025-01-14 ASSESSMENT — PAIN - FUNCTIONAL ASSESSMENT
PAIN_FUNCTIONAL_ASSESSMENT: 0-10

## 2025-01-14 NOTE — ANESTHESIA PREPROCEDURE EVALUATION
Patient: Evan Sewell    Procedure Information       Date/Time: 01/14/25 0845    Procedures:       BIOPSY, PROSTATE, WITH IMAGING GUIDANCE      ULTRASOUND, PROSTATE, ENDORECTAL      Ultrasound guidance for prostate bx    Location: [facility] OR 01 / [facility] OR    Surgeons: Gustabo Smith MD            Relevant Problems   /Renal   (+) Benign prostatic hyperplasia with urinary obstruction and other lower urinary tract symptoms       Clinical information reviewed:   Tobacco  Allergies  Meds   Med Hx  Surg Hx   Fam Hx  Soc Hx        NPO Detail:  NPO/Void Status  Carbohydrate Drink Given Prior to Surgery? : N  Date of Last Liquid: 01/13/25  Time of Last Liquid: 2100  Date of Last Solid: 01/13/25  Time of Last Solid: 1900  Last Intake Type: Clear fluids  Time of Last Void: 0737         Physical Exam    Airway  Mallampati: II  TM distance: >3 FB  Neck ROM: full     Cardiovascular   Rhythm: regular  Rate: normal     Dental    Pulmonary    Abdominal        Anesthesia Plan    History of general anesthesia?: yes  History of complications of general anesthesia?: no    ASA 2     MAC     intravenous induction   Anesthetic plan and risks discussed with patient.

## 2025-01-14 NOTE — DISCHARGE INSTRUCTIONS
For the best pain control alternate acetaminophen/Tylenol with ibuprofen every 3 hours for the first few days after surgery. For example: if you take Tylenol at 12:00 follow with ibuprofen at 3:00, Tylenol again at 6:00 and ibuprofen at 9:00 and so forth.  Using this method of pain control will ensure that every 3 hours you will have a pain medication available.  If you are taking a narcotic pain medication that also contains acetaminophen/Tylenol please monitor how much you are taking per day. The maximum dose of acetaminophen/tylenol for a healthy adult with no pre-existing/known liver conditions is 4000 mg/day.  As always consult with your primary care doctor or surgeon regarding the best method of pain control for you.

## 2025-01-14 NOTE — ANESTHESIA POSTPROCEDURE EVALUATION
Patient: Evan Sewell    Procedure Summary       Date: 01/14/25 Room / Location: [facility] OR 01 / [facility]    Anesthesia Start: 0903 Anesthesia Stop: 0923    Procedures:       BIOPSY, PROSTATE, WITH IMAGING GUIDANCE      ULTRASOUND, PROSTATE, ENDORECTAL      Ultrasound guidance for prostate bx Diagnosis:       Elevated PSA      (Elevated PSA [R97.20])    Surgeons: Gustabo Smith MD Responsible Provider: Davin Lowery MD    Anesthesia Type: MAC ASA Status: 2            Anesthesia Type: MAC    Vitals Value Taken Time   /62 01/14/25 0934   Temp 36.1 °C (97 °F) 01/14/25 0919   Pulse 73 01/14/25 0934   Resp 18 01/14/25 0934   SpO2 100 % 01/14/25 0934       Anesthesia Post Evaluation    Patient location during evaluation: bedside  Patient participation: complete - patient participated  Level of consciousness: sleepy but conscious  Pain management: adequate  Airway patency: patent  Cardiovascular status: acceptable  Respiratory status: acceptable  Hydration status: acceptable  Postoperative Nausea and Vomiting: none    No notable events documented.

## 2025-01-14 NOTE — OP NOTE
BIOPSY, PROSTATE, WITH IMAGING GUIDANCE, ULTRASOUND, PROSTATE, ENDORECTAL, Ultrasound guidance for prostate bx Operative Note     Date: 2025  OR Location: [facility] OR    Name: Evan Sewell, : 1945, Age: 79 y.o., MRN: [id number], Sex: male    Diagnosis  Pre-op Diagnosis      * Elevated PSA [R97.20] Post-op Diagnosis     * Elevated PSA [R97.20]     Procedures  BIOPSY, PROSTATE, WITH IMAGING GUIDANCE  60639 - PA PROSTATE NEEDLE BIOPSY ANY APPROACH    ULTRASOUND, PROSTATE, ENDORECTAL  56406 - CHG US TRANSRECTAL    Ultrasound guidance for prostate bx  37243 - CHG US GUIDANCE NEEDLE PLACEMENT IMG S&I      Surgeons      * Gustabo Smith - Primary    Resident/Fellow/Other Assistant:  Surgeons and Role:  * No surgeons found with a matching role *    Staff:   Cbulator: Amaya Lee Person: Ynes    Anesthesia Staff: Anesthesiologist: Davin Lowery MD    Procedure Summary  Anesthesia: Monitor Anesthesia Care  ASA: II  Estimated Blood Loss: 0mL  Intra-op Medications: Administrations occurring from 0845 to 0900 on 25:  * No intraprocedure medications in log *           Anesthesia Record               Intraprocedure I/O Totals       None           Specimen:   ID Type Source Tests Collected by Time   1 : PROSTATE NEEDLE BIOPSY RIGHT Tissue PROSTATE NEEDLE BIOPSY RIGHT SURGICAL PATHOLOGY EXAM Gustabo Smith MD 2025 0910   2 : PROSTATE NEEDLE BIOPSY LEFT Tissue PROSTATE NEEDLE BIOPSY LEFT SURGICAL PATHOLOGY EXAM Gustabo Smith MD 2025 0910   3 : AREA OF INTEREST#1 Tissue PROSTATE BIOPSY TARGETED LILY SURGICAL PATHOLOGY EXAM Gustabo Smith MD 2025 0910                 Drains and/or Catheters: * None in log *    Tourniquet Times:             Indications: Evan Sewell is an 79 y.o. male who is having surgery for Elevated PSA [R97.20].     The patient was seen in the preoperative area. The risks, benefits, complications, treatment options, non-operative alternatives, expected recovery and outcomes were  discussed with the patient. The possibilities of reaction to medication, pulmonary aspiration, injury to surrounding structures, bleeding, recurrent infection, the need for additional procedures, failure to diagnose a condition, and creating a complication requiring transfusion or operation were discussed with the patient. The patient concurred with the proposed plan, giving informed consent.  The site of surgery was properly noted/marked if necessary per policy. The patient has been actively warmed in preoperative area.   Pre Op dx: Elevated PSA and Abnormal MRI of the prostate    Post Op Dx: SAME    Procedure: MRI Guided Fusion Bx of the prostate    Physician: ESTELA    Anesthesia: MAC    Estimated Blood Loss: Minimal    Complications: NONE    Indications and Consent: Patient present for prostate Biopsy of lesion found on MRI. After the risks,, benefits, and indications were explained he consented to the procedure.    PROCEDURE: After adequate sedation was obtained the ultrasound probe was inserted into the rectum. An ultrasound sweep of the prostate was performed. These images were then fused with the previously obtained MRI images. The lesion(s) were identified. Multiple targeted biopsies were obtained . I also performed standard Sextant biopsies of the right and left lobe of the prostate. The patient tolerated the procedure well.     Attending Attestation: I was present and scrubbed for the entire procedure.    Gustabo Smith  Phone Number: [phone]       Patient requests all Lab and Radiology Results on their Discharge Instructions

## 2025-01-20 ASSESSMENT — ENCOUNTER SYMPTOMS
PSYCHIATRIC NEGATIVE: 1
COUGH: 0
CHILLS: 0
FEVER: 0
ENDOCRINE NEGATIVE: 1
ALLERGIC/IMMUNOLOGIC NEGATIVE: 1
EYES NEGATIVE: 1
SHORTNESS OF BREATH: 0
DIFFICULTY URINATING: 0
NAUSEA: 0

## 2025-01-20 NOTE — PROGRESS NOTES
Subjective   Patient ID: Evan Sewell is a 79 y.o. male.    HPI  Patient is here for MRI fusion bx results. Path showed. MRI showed stable PI-RAD 5 lesion. Also showed Stable T2 hyperintense/T1 hypointense enhancing lesions within the left hemisacrum and inferior right pubic ramus. Lesions arem indeterminate significance. Recommend correlation with bone scan. Most recent PSA was  11.43 on 11/24. Prior PSA was 10.25 on 10/24.  . Previous PSA was 8.36 (4/24).  Prior PSA was  8.09 on 10/23. Prior PSA was 5.77 (9/22) PRevious PSA was 6.53 on 12/2021. Prior PSA was 6.75 11/20...He hade negative MRI fusion bx on 11/23. patient did have a Urine select test 11/18 which showed 38% chance of detecting low grade and a 13% chance of detecting a [place] of 7 or higher..he has had a couple negative bx done in the past dating back to 12/13. ...Chronic BPH Sx are mild and stable...No urgency or frequency...weak stream at times...No dysuria..No hematuria...Nocturia 2-3. ...caffeine does worsen sx...He is taking Doxazosin for HTN. . Patient has Hx of Microhematuria and Renal Cyst. Last U/S and cysto was on 6/23. . ED is chronic and not an issue           Review of Systems   Constitutional:  Negative for chills and fever.   HENT: Negative.     Eyes: Negative.    Respiratory:  Negative for cough and shortness of breath.    Cardiovascular:  Negative for chest pain and leg swelling.   Gastrointestinal:  Negative for nausea.   Endocrine: Negative.    Genitourinary:  Negative for difficulty urinating.        Negative except for documented in HPI   Allergic/Immunologic: Negative.    Neurological:         Alert & oriented X 3   Hematological:         Denies blood thinners   Psychiatric/Behavioral: Negative.         Objective   Physical Exam    Assessment/Plan   There are no diagnoses linked to this encounter.

## 2025-01-27 ENCOUNTER — APPOINTMENT (OUTPATIENT)
Dept: UROLOGY | Facility: CLINIC | Age: 80
End: 2025-01-27
Payer: MEDICARE

## 2025-01-27 DIAGNOSIS — N13.8 BENIGN PROSTATIC HYPERPLASIA WITH URINARY OBSTRUCTION AND OTHER LOWER URINARY TRACT SYMPTOMS: ICD-10-CM

## 2025-01-27 DIAGNOSIS — N40.1 BENIGN PROSTATIC HYPERPLASIA WITH URINARY OBSTRUCTION AND OTHER LOWER URINARY TRACT SYMPTOMS: ICD-10-CM

## 2025-01-27 DIAGNOSIS — N52.9 ERECTILE DYSFUNCTION, UNSPECIFIED ERECTILE DYSFUNCTION TYPE: ICD-10-CM

## 2025-01-27 DIAGNOSIS — R97.20 ELEVATED PSA: ICD-10-CM

## 2025-01-27 DIAGNOSIS — R35.1 NOCTURIA: ICD-10-CM

## 2025-01-27 DIAGNOSIS — R94.8 ABNORMAL RADIONUCLIDE BONE SCAN: ICD-10-CM

## 2025-01-27 LAB
LAB AP ASR DISCLAIMER: NORMAL
LABORATORY COMMENT REPORT: NORMAL
PATH REPORT.FINAL DX SPEC: NORMAL
PATH REPORT.GROSS SPEC: NORMAL
PATH REPORT.RELEVANT HX SPEC: NORMAL
PATH REPORT.TOTAL CANCER: NORMAL

## 2025-01-28 ASSESSMENT — ENCOUNTER SYMPTOMS
COUGH: 0
FEVER: 0
CHILLS: 0
SHORTNESS OF BREATH: 0
ENDOCRINE NEGATIVE: 1
DIFFICULTY URINATING: 0
PSYCHIATRIC NEGATIVE: 1
ALLERGIC/IMMUNOLOGIC NEGATIVE: 1
NAUSEA: 0
EYES NEGATIVE: 1

## 2025-01-28 NOTE — PROGRESS NOTES
Subjective   Patient ID: Evan Sewell is a 79 y.o. male.    Virtual or Telephone Consent    An interactive audio and video telecommunication system which permits real time communications between the patient (at the originating site) and provider (at the distant site) was utilized to provide this telehealth service.   Verbal consent was requested and obtained from Evan Sewell on this date, 01/29/25 for a telehealth visit.     HPI  Patient is here for MRI fusion bx results. Path showed benign prostatic tissue with inflammation.. MRI 11/24 showed stable PI-RAD 5 lesion when compared 10/23. . Also showed Stable T2 hyperintense/T1 hypointense enhancing lesions within the left hemisacrum and inferior right pubic ramus. Lesions are indeterminate significance. Recommend correlation with bone scan. This was done. Most recent PSA was  11.43 on 11/24. Prior PSA was 10.25 on 10/24.  . Previous PSA was 8.36 (4/24).  Prior PSA was  8.09 on 10/23. Prior PSA was 5.77 (9/22) PRevious PSA was 6.53 on 12/2021. Prior PSA was 6.75 11/20...He hade negative MRI fusion bx on 11/23. patient did have a Urine select test 11/18 which showed 38% chance of detecting low grade and a 13% chance of detecting a [place] of 7 or higher..he has had a couple negative bx done in the past dating back to 12/13. ...Chronic BPH Sx are mild and stable...No urgency or frequency...weak stream at times...No dysuria..No hematuria...Nocturia 2-3. ...caffeine does worsen sx...He is taking Doxazosin for HTN. . Patient has Hx of Microhematuria and Renal Cyst. Last U/S and cysto was on 6/23. . ED is chronic and not an issue           Review of Systems   Constitutional:  Negative for chills and fever.   HENT: Negative.     Eyes: Negative.    Respiratory:  Negative for cough and shortness of breath.    Cardiovascular:  Negative for chest pain and leg swelling.   Gastrointestinal:  Negative for nausea.   Endocrine: Negative.    Genitourinary:  Negative for  difficulty urinating.        Negative except for documented in HPI   Allergic/Immunologic: Negative.    Neurological:         Alert & oriented X 3   Hematological:         Denies blood thinners   Psychiatric/Behavioral: Negative.         Objective   Physical Exam  No PE done given the virtual nature of visit.   Assessment/Plan       Diagnoses and all orders for this visit:  Benign prostatic hyperplasia with urinary obstruction and other lower urinary tract symptoms  Elevated PSA  Nocturia  Erectile dysfunction, unspecified erectile dysfunction type    All available PSA values reviewed, Options discussed. Questions answered.  Path report reviewed. Tx Options discussed. Pros/cons of tx options reviewed. Questions answered  Discussed chronic inflammation that has been seen in past Bx results as well  Bactrim x 30 days Rx given  NSAIDS x 2 weeks   Diet changes for prostate health discussed and educational information given. Pros/Cons of prostate health supplements discussed.   Treatment options for LUTS reviewed  Continue Cardura  Discussed timed voiding. Discussed fluid and caffeine intake  Treatment options for ED reviewed-not an issue  Lifestyle change to help prevent UTIs discussed. Encouraged fluid intake-No recent Sx  Past UA reviewed  Reviewed U/S and cysto notes    F/U 6-8 weeks virtual with PSA

## 2025-01-29 ENCOUNTER — TELEMEDICINE (OUTPATIENT)
Dept: UROLOGY | Facility: CLINIC | Age: 80
End: 2025-01-29
Payer: MEDICARE

## 2025-01-29 DIAGNOSIS — N52.9 ERECTILE DYSFUNCTION, UNSPECIFIED ERECTILE DYSFUNCTION TYPE: ICD-10-CM

## 2025-01-29 DIAGNOSIS — R97.20 ELEVATED PSA: ICD-10-CM

## 2025-01-29 DIAGNOSIS — R35.1 NOCTURIA: ICD-10-CM

## 2025-01-29 DIAGNOSIS — N40.1 BENIGN PROSTATIC HYPERPLASIA WITH URINARY OBSTRUCTION AND OTHER LOWER URINARY TRACT SYMPTOMS: ICD-10-CM

## 2025-01-29 DIAGNOSIS — N13.8 BENIGN PROSTATIC HYPERPLASIA WITH URINARY OBSTRUCTION AND OTHER LOWER URINARY TRACT SYMPTOMS: ICD-10-CM

## 2025-01-29 PROCEDURE — 1036F TOBACCO NON-USER: CPT | Performed by: UROLOGY

## 2025-01-29 PROCEDURE — 99214 OFFICE O/P EST MOD 30 MIN: CPT | Performed by: UROLOGY

## 2025-01-29 PROCEDURE — 1159F MED LIST DOCD IN RCRD: CPT | Performed by: UROLOGY

## 2025-01-29 RX ORDER — SULFAMETHOXAZOLE AND TRIMETHOPRIM 800; 160 MG/1; MG/1
1 TABLET ORAL DAILY
Qty: 30 TABLET | Refills: 0 | Status: SHIPPED | OUTPATIENT
Start: 2025-01-29 | End: 2025-02-28

## 2025-02-01 DIAGNOSIS — R97.20 ELEVATED PSA: ICD-10-CM

## 2025-02-28 LAB — PSA SERPL-MCNC: 7.94 NG/ML

## 2025-03-10 ASSESSMENT — ENCOUNTER SYMPTOMS
ENDOCRINE NEGATIVE: 1
DIFFICULTY URINATING: 0
SHORTNESS OF BREATH: 0
FEVER: 0
PSYCHIATRIC NEGATIVE: 1
NAUSEA: 0
COUGH: 0
ALLERGIC/IMMUNOLOGIC NEGATIVE: 1
EYES NEGATIVE: 1
CHILLS: 0

## 2025-03-10 NOTE — PROGRESS NOTES
Subjective   Patient ID: Evan Sewell is a 80 y.o. male.    Virtual or Telephone Consent    An interactive audio and video telecommunication system which permits real time communications between the patient (at the originating site) and provider (at the distant site) was utilized to provide this telehealth service.   Verbal consent was requested and obtained from Evan Sewell on this date, 03/13/25 for a telehealth visit and the patient's location was confirmed at the time of the visit.    HPI  Chronic hx of elevated PSA. Most recent PSA was  7.94 on 2/25. Prior PSA was 11.43 on 11/24. Prior PSA was 10.25 on 10/24.  . Previous PSA was 8.36 (4/24).  Prior PSA was  8.09 on 10/23. Prior PSA was 5.77 (9/22) PRevious PSA was 6.53 on 12/2021. Prior PSA was 6.75 11/20..Recent Negative MRI bx on 1/25 MRI at that time showed stable PI-RAD 5 lesion. .He hade negative MRI fusion bx on 11/23. He also had a negative U/S guided Bx in the office.  Patient did have a Urine select test 11/18 which showed 38% chance of detecting low grade and a 13% chance of detecting a [place] of 7 or higher..he has had a couple negative bx done in the past dating back to 12/13. ...Chronic BPH Sx are mild and stable...No urgency or frequency...weak stream at times...No dysuria..No hematuria...Nocturia 2-3. ...caffeine does worsen sx...He is taking Doxazosin for HTN. . Patient has Hx of Microhematuria and Renal Cyst. Last U/S and cysto was on 6/23. . ED is chronic and not an issue           Review of Systems   Constitutional:  Negative for chills and fever.   HENT: Negative.     Eyes: Negative.    Respiratory:  Negative for cough and shortness of breath.    Cardiovascular:  Negative for chest pain and leg swelling.   Gastrointestinal:  Negative for nausea.   Endocrine: Negative.    Genitourinary:  Negative for difficulty urinating.        Negative except for documented in HPI   Allergic/Immunologic: Negative.    Neurological:         Alert  & oriented X 3   Hematological:         Denies blood thinners   Psychiatric/Behavioral: Negative.         Objective   Physical Exam  No PE done given the virtual nature of visit.   Assessment/Plan       Diagnoses and all orders for this visit:  Benign prostatic hyperplasia with urinary obstruction and other lower urinary tract symptoms  Elevated PSA  Nocturia  Erectile dysfunction, unspecified erectile dysfunction type  Abnormal radionuclide bone scan    Renal U/S reviewed from 2023-F/U renal U/S ordered  All available PSA values reviewed, Options discussed. Questions answered.  Past MRIs reviewed  Past Bx reviewed  Patient was on Antibx prior to this most recent PSA   Diet changes for prostate health discussed and educational information given. Pros/Cons of prostate health supplements discussed.   Treatment options for LUTS reviewed  Continue Cardura  Discussed timed voiding. Discussed fluid and caffeine intake  Treatment options for ED reviewed-observe  Lifestyle change to help prevent UTIs discussed. Encouraged fluid intake.  Past UA reviewed from 10/24-trace blood-Patient has had chronically    F/U 6 months with PSA and Renal U/S

## 2025-03-13 ENCOUNTER — APPOINTMENT (OUTPATIENT)
Dept: UROLOGY | Facility: CLINIC | Age: 80
End: 2025-03-13
Payer: MEDICARE

## 2025-03-13 DIAGNOSIS — R35.1 NOCTURIA: ICD-10-CM

## 2025-03-13 DIAGNOSIS — N28.1 RENAL CYST: ICD-10-CM

## 2025-03-13 DIAGNOSIS — N52.9 ERECTILE DYSFUNCTION, UNSPECIFIED ERECTILE DYSFUNCTION TYPE: ICD-10-CM

## 2025-03-13 DIAGNOSIS — N13.8 BENIGN PROSTATIC HYPERPLASIA WITH URINARY OBSTRUCTION AND OTHER LOWER URINARY TRACT SYMPTOMS: ICD-10-CM

## 2025-03-13 DIAGNOSIS — N40.1 BENIGN PROSTATIC HYPERPLASIA WITH URINARY OBSTRUCTION AND OTHER LOWER URINARY TRACT SYMPTOMS: ICD-10-CM

## 2025-03-13 DIAGNOSIS — R97.20 ELEVATED PSA: ICD-10-CM

## 2025-03-13 DIAGNOSIS — R94.8 ABNORMAL RADIONUCLIDE BONE SCAN: ICD-10-CM

## 2025-03-13 PROCEDURE — 99213 OFFICE O/P EST LOW 20 MIN: CPT | Performed by: UROLOGY

## 2025-03-13 PROCEDURE — 1036F TOBACCO NON-USER: CPT | Performed by: UROLOGY

## 2025-03-20 DIAGNOSIS — R30.0 DYSURIA: ICD-10-CM

## 2025-03-22 LAB — BACTERIA UR CULT: NORMAL

## 2025-08-18 ENCOUNTER — TELEPHONE (OUTPATIENT)
Dept: UROLOGY | Facility: CLINIC | Age: 80
End: 2025-08-18
Payer: MEDICARE

## 2025-09-02 ENCOUNTER — APPOINTMENT (OUTPATIENT)
Dept: RADIOLOGY | Facility: CLINIC | Age: 80
End: 2025-09-02
Payer: MEDICARE

## 2025-09-04 ASSESSMENT — ENCOUNTER SYMPTOMS
PSYCHIATRIC NEGATIVE: 1
NAUSEA: 0
SHORTNESS OF BREATH: 0
CHILLS: 0
ENDOCRINE NEGATIVE: 1
EYES NEGATIVE: 1
COUGH: 0
DIFFICULTY URINATING: 0
ALLERGIC/IMMUNOLOGIC NEGATIVE: 1
FEVER: 0

## 2025-09-05 ENCOUNTER — APPOINTMENT (OUTPATIENT)
Dept: UROLOGY | Facility: CLINIC | Age: 80
End: 2025-09-05
Payer: MEDICARE

## 2025-09-15 ENCOUNTER — APPOINTMENT (OUTPATIENT)
Dept: UROLOGY | Facility: CLINIC | Age: 80
End: 2025-09-15
Payer: MEDICARE

## (undated) DEVICE — SOLUTION, SALINE, 100ML

## (undated) DEVICE — TOWEL, OR, XRAY DECTECTABLE, 17 X 27, BLUE, 4/PK, STERILE

## (undated) DEVICE — CIRCUIT, BREATHING, ADULT, B/V FILTER, HMEF, 3 L BAG, 108 IN

## (undated) DEVICE — DRESSING, GAUZE, SPONGE, 12 PLY, CURITY, 4 X 4 IN, RIGID TRAY, STERILE

## (undated) DEVICE — SOLUTION, PREP, PVP IODINE, FLIP TOP, 4OZ

## (undated) DEVICE — MASK, FACE, ANESTHESIA, ADULT LARGE, INFL PORT, LF

## (undated) DEVICE — MARKER, SKIN, REGULAR TIP, W/W/FLEXI RULER, LABEL

## (undated) DEVICE — COVER, MAYO STAND, 23-1/2 X 56, LF

## (undated) DEVICE — DRESSING, NON-ADHERENT, TELFA, OUCHLESS, 3 X 8 IN, STERILE

## (undated) DEVICE — MASK, FACE, ANESTHESIA, ADULT, LARGE, P6, RED

## (undated) DEVICE — NEEDLE, BIOPSY, MAX CORE, 18G

## (undated) DEVICE — GLOVE, PROTEXIS PI CLASSIC, SZ-8.0, PF, PF, LF

## (undated) DEVICE — SCISSOR, BANDAGE, LISTER, ANGLED BLUNT, 4.5 IN, ECONOMY

## (undated) DEVICE — TOWEL, OR, XRAY DECTECTABLE, 17 X 27, BLUE, STERILE

## (undated) DEVICE — STRAP, KNEE AND BODY, SINGLE USE

## (undated) DEVICE — STRAP, ARMBOARD, 3IN, BLUE/WHITE, SECURE HOOK